# Patient Record
Sex: MALE | Employment: OTHER | URBAN - METROPOLITAN AREA
[De-identification: names, ages, dates, MRNs, and addresses within clinical notes are randomized per-mention and may not be internally consistent; named-entity substitution may affect disease eponyms.]

---

## 2018-11-14 ENCOUNTER — OFFICE VISIT (OUTPATIENT)
Dept: CARDIOLOGY CLINIC | Facility: CLINIC | Age: 83
End: 2018-11-14
Payer: MEDICARE

## 2018-11-14 VITALS
BODY MASS INDEX: 28.04 KG/M2 | OXYGEN SATURATION: 98 % | SYSTOLIC BLOOD PRESSURE: 140 MMHG | HEIGHT: 71 IN | DIASTOLIC BLOOD PRESSURE: 70 MMHG | HEART RATE: 64 BPM | WEIGHT: 200.3 LBS

## 2018-11-14 DIAGNOSIS — R07.9 CHEST PAIN IN ADULT: ICD-10-CM

## 2018-11-14 DIAGNOSIS — R06.00 EXERTIONAL DYSPNEA: Primary | ICD-10-CM

## 2018-11-14 DIAGNOSIS — I48.0 PAROXYSMAL A-FIB (HCC): ICD-10-CM

## 2018-11-14 DIAGNOSIS — R94.31 ABNORMAL EKG: ICD-10-CM

## 2018-11-14 PROCEDURE — 93000 ELECTROCARDIOGRAM COMPLETE: CPT | Performed by: INTERNAL MEDICINE

## 2018-11-14 PROCEDURE — 99204 OFFICE O/P NEW MOD 45 MIN: CPT | Performed by: INTERNAL MEDICINE

## 2018-11-14 RX ORDER — UBIDECARENONE 100 MG
100 CAPSULE ORAL DAILY
COMMUNITY

## 2018-11-14 RX ORDER — SIMVASTATIN 20 MG
20 TABLET ORAL DAILY
COMMUNITY
Start: 2018-11-03 | End: 2018-12-28 | Stop reason: SDUPTHER

## 2018-11-14 RX ORDER — AMLODIPINE AND VALSARTAN 5; 160 MG/1; MG/1
1 TABLET ORAL DAILY
COMMUNITY
Start: 2018-10-06 | End: 2018-12-28 | Stop reason: SDUPTHER

## 2018-11-14 RX ORDER — LUTEIN 6 MG
TABLET ORAL
COMMUNITY

## 2018-11-14 RX ORDER — OMEGA-3-ACID ETHYL ESTERS 1 G/1
2 CAPSULE, LIQUID FILLED ORAL DAILY
COMMUNITY
End: 2020-01-08

## 2018-11-14 RX ORDER — IBUPROFEN 800 MG
TABLET ORAL DAILY
COMMUNITY

## 2018-11-14 RX ORDER — DIPHENOXYLATE HYDROCHLORIDE AND ATROPINE SULFATE 2.5; .025 MG/1; MG/1
1 TABLET ORAL DAILY
COMMUNITY

## 2018-11-14 RX ORDER — METOPROLOL SUCCINATE 25 MG/1
25 TABLET, EXTENDED RELEASE ORAL DAILY
COMMUNITY
Start: 2018-09-27 | End: 2018-12-28 | Stop reason: SDUPTHER

## 2018-11-14 NOTE — PROGRESS NOTES
Cardiology Outpatient Consultation                                                           Osbaldo Larsen  [id number]  1934      Consult for:Dyspnea  Appreciate consult by: Smooth Naik DO/ Dr Karlene Fatima    1  Exertional dyspnea     2  Chest pain in adult         Discussion/Summary:  Exertional Dyspnea/abnormal ekg- lexiscan stress test to rule out ischemia  Bifascicular heart block likely degenerative  Murmur/Dyspnea- echo 2d to evaluate diastolic function    Paroxysmal afib- continue aspirin 81mg every other day with food + simvastatin 20mg    GERD- pending GI scope    Carotid Artery U/S- aspirin     Unstable gait/shuffling- balance center assessment/physical activity? Follow-up with primary pending    HPI:  80-year-old gentleman with history of bifascicular heart block, paroxysmal atrial fibrillation previously on anticoagulation currently on aspirin, mild carotid artery disease presents for initial visitation  Reports having some exertional shortness of breath  No prior history of myocardial infarction or stent placement  States having some mild lower extremity edema  Reports a sedentary lifestyle after a fall  He reports his last fall was mechanical   Reports having unstable gait               PMH  Paroxysmal atrial fibrillation  Sick Sinus Syndrome  Mixed Hld  Htn  Carotid Stenosis- aspirin      Social Hx  Wife  Son [place]   Retired  Former smoker quit 1965      History reviewed  No pertinent past medical history  Social History     Social History    Marital status: /Civil Union     Spouse name: N/A    Number of children: N/A    Years of education: N/A     Occupational History    Not on file       Social History Main Topics    Smoking status: Former Smoker     Quit date: 1950    Smokeless tobacco: Never Used    Alcohol use No    Drug use: Unknown    Sexual activity: Not on file     Other Topics Concern    Not on file     Social History Narrative    No narrative on file      Family History   Problem Relation Age of Onset    No Known Problems Mother     No Known Problems Father      History reviewed  No pertinent surgical history  Current Outpatient Prescriptions:     amLODIPine-valsartan (EXFORGE) 5-160 MG per tablet, , Disp: , Rfl:     Bioflavonoid Products (KJ C PO), Take by mouth, Disp: , Rfl:     Calcium Carb-Cholecalciferol 600-1000 MG-UNIT CAPS, Take by mouth, Disp: , Rfl:     cholecalciferol (VITAMIN D3) 1,000 units tablet, Take 1,000 Units by mouth daily, Disp: , Rfl:     co-enzyme Q-10 30 MG capsule, Take 30 mg by mouth daily, Disp: , Rfl:     DEXILANT 60 MG capsule, Take 60 mg by mouth daily, Disp: , Rfl:     Lutein 10 MG TABS, Take by mouth, Disp: , Rfl:     metoprolol succinate (TOPROL-XL) 25 mg 24 hr tablet, Take 25 mg by mouth daily, Disp: , Rfl:     multivitamin (THERAGRAN) TABS, Take 1 tablet by mouth daily, Disp: , Rfl:     omega-3-acid ethyl esters (LOVAZA) 1 g capsule, Take 2 g by mouth daily, Disp: , Rfl:     Probiotic Product (PROBIOTIC-10 PO), Take by mouth, Disp: , Rfl:     simvastatin (ZOCOR) 20 mg tablet, Take 20 mg by mouth daily, Disp: , Rfl:     SUPER B COMPLEX/C PO, Take by mouth, Disp: , Rfl:   No Known Allergies  Vitals:    11/14/18 1511   BP: 140/70   BP Location: Right arm   Patient Position: Sitting   Cuff Size: Large   Pulse: 64   SpO2: 98%   Weight: 90 9 kg (200 lb 4 8 oz)   Height: 5' 11 25" (1 81 m)       Review of Systems:  Review of Systems   Constitutional: Negative  HENT: Negative  Eyes: Negative  Respiratory: Positive for shortness of breath  Cardiovascular: Positive for chest pain  Gastrointestinal: Negative  Endocrine: Negative  Genitourinary: Negative  Musculoskeletal: Negative  Skin: Negative  Allergic/Immunologic: Negative  Neurological: Negative  Hematological: Negative  Psychiatric/Behavioral: Negative          Vitals:    11/14/18 1511   BP: 140/70   BP Location: Right arm   Patient Position: Sitting   Cuff Size: Large   Pulse: 64   SpO2: 98%   Weight: 90 9 kg (200 lb 4 8 oz)   Height: 5' 11 25" (1 81 m)     Physical Exam:  Physical Exam   Constitutional: He is oriented to person, place, and time  He appears well-developed and well-nourished  No distress  HENT:   Head: Normocephalic and atraumatic  Right Ear: External ear normal    Left Ear: External ear normal    Eyes: Pupils are equal, round, and reactive to light  Conjunctivae are normal  Right eye exhibits no discharge  Left eye exhibits no discharge  No scleral icterus  Neck: Normal range of motion  Neck supple  No JVD present  No tracheal deviation present  No thyromegaly present  Cardiovascular: Normal rate and regular rhythm  Exam reveals gallop  Exam reveals no friction rub  Murmur heard  Pulmonary/Chest: Effort normal and breath sounds normal  No stridor  No respiratory distress  He has no wheezes  He has no rales  He exhibits no tenderness  Abdominal: Soft  Bowel sounds are normal  He exhibits no distension and no mass  There is no tenderness  There is no rebound and no guarding  Musculoskeletal: Normal range of motion  He exhibits no edema, tenderness or deformity  Neurological: He is alert and oriented to person, place, and time  He has normal reflexes  No cranial nerve deficit  He exhibits normal muscle tone  Coordination normal    Skin: Skin is warm and dry  No rash noted  He is not diaphoretic  No erythema  No pallor  Psychiatric: He has a normal mood and affect  His behavior is normal  Judgment and thought content normal    Nursing note and vitals reviewed  Labs:   Pending    Imaging & Testing   I have personally reviewed pertinent reports  EKG: Personally reviewed      Normal sinus rhythm  Right bundle branch left anterior fascicular block  Cannot rule out septal infract    Cardiac testing:   No results found for this or any previous visit  Kirsten Yip MD [facility] [phone]  Please call with any questions or suggestions    Counseling :  A description of the counseling:   Goals and Barriers:  Patient's ability to self care:  Medication side effect reviewed with patient in detail and all their questions answered  "This note has been constructed using a voice recognition system  Therefore there may be syntax, spelling, and/or grammatical errors   Please call if you have any questions  "

## 2018-11-25 PROBLEM — G89.29 CHRONIC PAIN OF LEFT KNEE: Status: ACTIVE | Noted: 2017-06-20

## 2018-11-25 PROBLEM — I49.5 SICK SINUS SYNDROME (HCC): Status: ACTIVE | Noted: 2018-11-25

## 2018-11-25 PROBLEM — N18.6 END STAGE RENAL DISEASE (HCC): Status: ACTIVE | Noted: 2018-02-27

## 2018-11-25 PROBLEM — M54.17 LUMBOSACRAL RADICULOPATHY: Status: ACTIVE | Noted: 2018-04-09

## 2018-11-25 PROBLEM — M62.81 MUSCLE WEAKNESS: Status: ACTIVE | Noted: 2018-02-27

## 2018-11-25 PROBLEM — M25.562 CHRONIC PAIN OF LEFT KNEE: Status: ACTIVE | Noted: 2017-06-20

## 2018-11-25 PROBLEM — R42 VERTIGO: Status: ACTIVE | Noted: 2018-03-11

## 2018-11-25 PROBLEM — I25.10 CAD (CORONARY ARTERY DISEASE): Status: ACTIVE | Noted: 2018-11-25

## 2018-11-25 PROBLEM — F03.90 UNCOMPLICATED SENILE DEMENTIA (HCC): Status: ACTIVE | Noted: 2017-10-12

## 2018-11-25 PROBLEM — C61 PROSTATE CANCER (HCC): Status: ACTIVE | Noted: 2017-10-12

## 2018-11-25 PROBLEM — I10 HYPERTENSIVE DISORDER: Status: ACTIVE | Noted: 2018-04-09

## 2018-11-25 PROBLEM — R53.83 FATIGUE: Status: ACTIVE | Noted: 2018-11-25

## 2018-11-25 PROBLEM — I48.0 PAROXYSMAL ATRIAL FIBRILLATION (HCC): Status: ACTIVE | Noted: 2018-11-25

## 2018-11-25 PROBLEM — S06.360A TRAUMATIC HEMORRHAGE OF CEREBRUM WITHOUT LOSS OF CONSCIOUSNESS (HCC): Status: ACTIVE | Noted: 2018-03-11

## 2018-11-26 ENCOUNTER — TRANSCRIBE ORDERS (OUTPATIENT)
Dept: ADMINISTRATIVE | Facility: HOSPITAL | Age: 83
End: 2018-11-26

## 2018-11-26 ENCOUNTER — HOSPITAL ENCOUNTER (OUTPATIENT)
Dept: RADIOLOGY | Facility: HOSPITAL | Age: 83
Discharge: HOME | End: 2018-11-26
Payer: MEDICARE

## 2018-11-26 ENCOUNTER — OFFICE VISIT (OUTPATIENT)
Dept: FAMILY MEDICINE CLINIC | Facility: CLINIC | Age: 83
End: 2018-11-26
Payer: MEDICARE

## 2018-11-26 VITALS
DIASTOLIC BLOOD PRESSURE: 76 MMHG | HEART RATE: 76 BPM | BODY MASS INDEX: 28.14 KG/M2 | WEIGHT: 201 LBS | SYSTOLIC BLOOD PRESSURE: 134 MMHG | TEMPERATURE: 97.1 F | RESPIRATION RATE: 18 BRPM | HEIGHT: 71 IN

## 2018-11-26 DIAGNOSIS — C44.41 BASAL CELL CARCINOMA OF SKIN OF SCALP AND NECK: ICD-10-CM

## 2018-11-26 DIAGNOSIS — R06.00 EXERTIONAL DYSPNEA: ICD-10-CM

## 2018-11-26 DIAGNOSIS — I10 BENIGN ESSENTIAL HYPERTENSION: ICD-10-CM

## 2018-11-26 DIAGNOSIS — I48.0 PAROXYSMAL ATRIAL FIBRILLATION (HCC): ICD-10-CM

## 2018-11-26 DIAGNOSIS — R05.3 PERSISTENT COUGH: Primary | ICD-10-CM

## 2018-11-26 DIAGNOSIS — R05.3 PERSISTENT COUGH: ICD-10-CM

## 2018-11-26 DIAGNOSIS — K21.9 GASTROESOPHAGEAL REFLUX DISEASE, ESOPHAGITIS PRESENCE NOT SPECIFIED: ICD-10-CM

## 2018-11-26 DIAGNOSIS — E78.5 HYPERLIPIDEMIA, UNSPECIFIED HYPERLIPIDEMIA TYPE: ICD-10-CM

## 2018-11-26 PROCEDURE — 99204 OFFICE O/P NEW MOD 45 MIN: CPT | Performed by: NURSE PRACTITIONER

## 2018-11-26 PROCEDURE — 71046 X-RAY EXAM CHEST 2 VIEWS: CPT

## 2018-11-26 NOTE — PROGRESS NOTES
Subjective:      Patient ID: Jed Mota is a 80 y o  male  Chief Complaint   Patient presents with    Cough     patient states started yesterday 11/25/18  af/rma        HPI  New to practice  Personal and family medical history reviewed  Seen cardiologist recently for exertional dyspnea and scheduled for stress test and ECHO  All medications reviewed with the patient  Has appt with Dr Joseph Vargas on 11/28 to establish care with him as PCP  Complaint with his medications  Has h/o skin cancer and under care of dermatologist and recently had biopsy done at nose area and waiting for results  Stated that having mild cough on and off from days and got worse last night and was very productive and this morning changed to dry cough  Denies chest pain, fever and chills  Stated that have exterional dyspnea and seeing cardiologist and cough did not change the severity of dyspnea  Patient has h/o Afib and was on anticoagulation and stopped when had the traumatic hemorrhage of cerebrum secondary to fall and on aspirin only  The following portions of the patient's history were reviewed and updated as appropriate: allergies, current medications, past family history, past medical history, past social history, past surgical history and problem list       Review of Systems   Constitutional: Negative for chills, fatigue and fever  HENT: Negative for congestion, ear discharge, ear pain, facial swelling, hearing loss, mouth sores, nosebleeds, postnasal drip, rhinorrhea, sinus pain, sinus pressure, sneezing, sore throat, trouble swallowing and voice change  Respiratory: Positive for cough and shortness of breath (as noted in HPI)  Negative for chest tightness and wheezing  Cardiovascular: Negative  Gastrointestinal: Negative for abdominal pain, constipation, diarrhea and nausea  Genitourinary: Negative  Musculoskeletal: Negative      Skin:        As noted in HPI     Neurological: Negative for dizziness, weakness, light-headedness and headaches  Psychiatric/Behavioral: Negative  Objective:    History   Smoking Status    Former Smoker    Quit date: 1950   Smokeless Tobacco    Never Used       Allergies: No Known Allergies    Vitals:  /76   Pulse 76   Temp (!) 97 1 °F (36 2 °C)   Resp 18   Ht 5' 11 25" (1 81 m)   Wt 91 2 kg (201 lb)   BMI 27 84 kg/m²     Current Outpatient Prescriptions   Medication Sig Dispense Refill    amLODIPine-valsartan (EXFORGE) 5-160 MG per tablet Take 1 tablet by mouth daily        Bioflavonoid Products (KJ C PO) Take by mouth      Calcium Carb-Cholecalciferol 600-1000 MG-UNIT CAPS Take by mouth      Cholecalciferol (VITAMIN D3) 400 units CAPS Take by mouth daily        co-enzyme Q-10 100 mg capsule Take 100 mg by mouth daily        DEXILANT 60 MG capsule Take 60 mg by mouth daily      Lutein 20 MG TABS Take by mouth      metoprolol succinate (TOPROL-XL) 25 mg 24 hr tablet Take 25 mg by mouth daily      multivitamin (THERAGRAN) TABS Take 1 tablet by mouth daily      omega-3-acid ethyl esters (LOVAZA) 1 g capsule Take 2 g by mouth daily      Probiotic Product (PROBIOTIC-10 PO) Take by mouth      simvastatin (ZOCOR) 20 mg tablet Take 20 mg by mouth daily      SUPER B COMPLEX/C PO Take by mouth       No current facility-administered medications for this visit  Physical Exam   Constitutional: He is oriented to person, place, and time  He appears well-developed and well-nourished  HENT:   Head: Normocephalic  Right Ear: Tympanic membrane, external ear and ear canal normal    Left Ear: Tympanic membrane, external ear and ear canal normal    Nose: Nose normal  Right sinus exhibits no maxillary sinus tenderness and no frontal sinus tenderness  Left sinus exhibits no maxillary sinus tenderness and no frontal sinus tenderness  Mouth/Throat: Oropharynx is clear and moist and mucous membranes are normal    Neck: Neck supple  Cardiovascular: Normal rate and regular rhythm  Murmur heard  Pulmonary/Chest: Effort normal  He has decreased breath sounds in the left upper field  Abdominal: Normal appearance and bowel sounds are normal  There is no hepatosplenomegaly  There is no tenderness  There is no rebound  Musculoskeletal: Normal range of motion  Lymphadenopathy:        Right cervical: No superficial cervical and no posterior cervical adenopathy present  Left cervical: No superficial cervical and no posterior cervical adenopathy present  Right: No supraclavicular adenopathy present  Left: No supraclavicular adenopathy present  Neurological: He is alert and oriented to person, place, and time  Skin: Skin is warm and dry  Psychiatric: He has a normal mood and affect  His behavior is normal  Judgment and thought content normal    Vitals reviewed  Assessment/Plan:         Diagnoses and all orders for this visit:    Persistent cough  Comments:  RUL is diminised on auscultation and xray ordered to r/o pneumonia  Orders:  -     XR chest pa & lateral; Future    Basal cell carcinoma of skin of scalp and neck  Comments:  Under care of dermatologist    Benign essential hypertension  Comments:  Under care of cardiologist and BP stable with current regimen    Paroxysmal atrial fibrillation ([address] )  Comments:  Last EKG done on 11/14 and showed SR and on aspirin  Hyperlipidemia, unspecified hyperlipidemia type  Comments:  on statin and tolearting it well    Exertional dyspnea  Comments:  managed by cardiologist and ECHO and stress test ordered  Gastroesophageal reflux disease, esophagitis presence not specified  Comments:  Stable with dexilant  BMI 27 0-27 9,adult            Patient Instructions:  Increase fluid intake, saline nasal rinses, and hot tea with honey and lemon  Cool air humidification can be helpful as well  M ay take Ibuprofen or Tylenol as needed for pain or fevers  Mucinex D for sinus congestion or Coricidin HBP if you have high blood pressure or a heart condition  Mucinex or Robitussin DM are effective for cough and chest congestion  Supportive care discussed and advised  Follow up for no improvement and worsening of conditions  Patient advised and educated when to see immediate medical care  Return if symptoms worsen or fail to improve        JOLANTA Appiah

## 2018-11-26 NOTE — PATIENT INSTRUCTIONS
Increase fluid intake, saline nasal rinses, and hot tea with honey and lemon  Cool air humidification can be helpful as well  M ay take Ibuprofen or Tylenol as needed for pain or fevers  Mucinex D for sinus congestion or Coricidin HBP if you have high blood pressure or a heart condition  Mucinex or Robitussin DM are effective for cough and chest congestion  Supportive care discussed and advised  Follow up for no improvement and worsening of conditions  Patient advised and educated when to see immediate medical care

## 2018-11-28 ENCOUNTER — OFFICE VISIT (OUTPATIENT)
Dept: FAMILY MEDICINE CLINIC | Facility: CLINIC | Age: 83
End: 2018-11-28
Payer: MEDICARE

## 2018-11-28 VITALS
WEIGHT: 198 LBS | RESPIRATION RATE: 16 BRPM | HEIGHT: 71 IN | DIASTOLIC BLOOD PRESSURE: 88 MMHG | TEMPERATURE: 96.6 F | HEART RATE: 64 BPM | BODY MASS INDEX: 27.72 KG/M2 | SYSTOLIC BLOOD PRESSURE: 148 MMHG

## 2018-11-28 DIAGNOSIS — I48.0 PAROXYSMAL ATRIAL FIBRILLATION (HCC): ICD-10-CM

## 2018-11-28 DIAGNOSIS — I10 BENIGN ESSENTIAL HYPERTENSION: ICD-10-CM

## 2018-11-28 DIAGNOSIS — E78.5 HYPERLIPIDEMIA, UNSPECIFIED HYPERLIPIDEMIA TYPE: ICD-10-CM

## 2018-11-28 DIAGNOSIS — C61 PROSTATE CANCER (HCC): ICD-10-CM

## 2018-11-28 DIAGNOSIS — C44.311 BASAL CELL CARCINOMA (BCC) OF SKIN OF NOSE: ICD-10-CM

## 2018-11-28 DIAGNOSIS — D51.0 PERNICIOUS ANEMIA: ICD-10-CM

## 2018-11-28 DIAGNOSIS — Z00.00 MEDICARE ANNUAL WELLNESS VISIT, INITIAL: Primary | ICD-10-CM

## 2018-11-28 PROBLEM — F03.90 UNCOMPLICATED SENILE DEMENTIA (HCC): Status: RESOLVED | Noted: 2017-10-12 | Resolved: 2018-11-28

## 2018-11-28 PROBLEM — N18.6 END STAGE RENAL DISEASE (HCC): Status: RESOLVED | Noted: 2018-02-27 | Resolved: 2018-11-28

## 2018-11-28 PROBLEM — R42 VERTIGO: Status: RESOLVED | Noted: 2018-03-11 | Resolved: 2018-11-28

## 2018-11-28 PROBLEM — K21.9 GERD (GASTROESOPHAGEAL REFLUX DISEASE): Status: ACTIVE | Noted: 2018-11-28

## 2018-11-28 PROCEDURE — G0438 PPPS, INITIAL VISIT: HCPCS | Performed by: FAMILY MEDICINE

## 2018-11-28 PROCEDURE — 99214 OFFICE O/P EST MOD 30 MIN: CPT | Performed by: FAMILY MEDICINE

## 2018-11-28 RX ORDER — ASPIRIN 81 MG/1
81 TABLET ORAL DAILY
COMMUNITY

## 2018-11-28 NOTE — PROGRESS NOTES
Assessment/Plan:    No problem-specific Assessment & Plan notes found for this encounter  I will refill all bp and chol meds if no major cardiac issues on upcoming stress test and echocardiogram    Yearly psa for prostate CA hx  Anemia? Per records, check labs to clarify  CAD? Await cardiac studies, cont statin  q6m labs and f/u advised     Diagnoses and all orders for this visit:    Medicare annual wellness visit, initial    [facility] ([facility] )  -     Cancel: TSH, 3rd generation; Future  -     TSH, 3rd generation; Future    Hyperlipidemia, unspecified hyperlipidemia type    Benign essential hypertension    Pernicious anemia  -     Cancel: CBC and differential; Future  -     Cancel: Iron Saturation %; Future  -     Cancel: Vitamin B12; Future  -     CBC and differential; Future  -     Iron Saturation %; Future  -     Vitamin B12; Future    Prostate cancer ([facility] )  -     Cancel: PSA, Total Screen; Future  -     PSA, Total Screen; Future    Basal cell carcinoma (BCC) of skin of nose    Other orders  -     aspirin (ECOTRIN LOW STRENGTH) 81 mg EC tablet; Take 81 mg by mouth daily              Return in about 1 month (around 12/28/2018) for Recheck  Subjective:      Patient ID: Fauzia Gomes is a 80 y o  male  Chief Complaint   Patient presents with   [address]       HPI   New pt  Hx of AF, now PAF  Hx of eliquis  Now just patricio Schmid on ice Feb 2018  Been to PT, inpt and outpt  Feels ok now  Declines more pt    htn meds from prior doctor  Seen by cardiologist  Advised might ask cardiologist to manage meds related to chf/paf in future    dexilant from Dr Rowdy Sanchez suggesting increased risk of ischemic CVA and chronic kidney damage with PPI use was advised  No CAD per pt    Last labs was Feb 2017    ESRD? Pt denies  Anemia?  Pt denies  Had been on iron in past    Eats/drink ok  Wife thinks he should drink more  Gets dizzy at times with position changes    Does not recall any AWV screening in the past by prior pcp    The following portions of the patient's history were reviewed and updated as appropriate: allergies, current medications, past family history, past medical history, past social history, past surgical history and problem list     Review of Systems   Constitutional: Positive for fatigue  Negative for appetite change, chills and fever  HENT: Negative for nosebleeds and trouble swallowing  Eyes: Negative for pain and redness  Respiratory: Positive for shortness of breath  Negative for cough  Cardiovascular: Positive for leg swelling  Negative for chest pain and palpitations  Gastrointestinal: Negative for blood in stool, bowel incontinence and nausea  Genitourinary: Negative for difficulty urinating and dysuria  Musculoskeletal: Negative for gait problem and myalgias  Skin: Negative for pallor and rash  Neurological: Negative for seizures and syncope  Psychiatric/Behavioral: Negative for agitation and suicidal ideas  The patient is not nervous/anxious            Current Outpatient Prescriptions   Medication Sig Dispense Refill    amLODIPine-valsartan (EXFORGE) 5-160 MG per tablet Take 1 tablet by mouth daily        aspirin (ECOTRIN LOW STRENGTH) 81 mg EC tablet Take 81 mg by mouth daily      Bioflavonoid Products (KJ C PO) Take by mouth      Calcium Carb-Cholecalciferol 600-1000 MG-UNIT CAPS Take by mouth      Cholecalciferol (VITAMIN D3) 400 units CAPS Take by mouth daily        co-enzyme Q-10 100 mg capsule Take 100 mg by mouth daily        DEXILANT 60 MG capsule Take 60 mg by mouth daily      Lutein 20 MG TABS Take by mouth      metoprolol succinate (TOPROL-XL) 25 mg 24 hr tablet Take 25 mg by mouth daily      multivitamin (THERAGRAN) TABS Take 1 tablet by mouth daily      omega-3-acid ethyl esters (LOVAZA) 1 g capsule Take 2 g by mouth daily      Probiotic Product (PROBIOTIC-10 PO) Take by mouth      simvastatin (ZOCOR) 20 mg tablet Take 20 mg by mouth daily      SUPER B COMPLEX/C PO Take by mouth       No current facility-administered medications for this visit  Objective:    /88   Pulse 64   Temp (!) 96 6 °F (35 9 °C)   Resp 16   Ht 5' 11 25" (1 81 m)   Wt 89 8 kg (198 lb)   BMI 27 42 kg/m²        Physical Exam   Constitutional: He appears well-developed  No distress  HENT:   Head: Normocephalic  Eyes: Conjunctivae are normal  Right eye exhibits no discharge  Left eye exhibits no discharge  No scleral icterus  Neck: Neck supple  Carotid bruit is not present  No thyromegaly present  Cardiovascular: Normal rate and intact distal pulses  No murmur heard  Pulmonary/Chest: Effort normal  No respiratory distress  He has no wheezes  He has no rales  Abdominal: Soft  There is no tenderness  Musculoskeletal: He exhibits no edema or deformity  Lymphadenopathy:     He has no cervical adenopathy  Neurological: He is alert  He exhibits normal muscle tone  Skin: Skin is warm and dry  No rash noted  No pallor  Psychiatric: His behavior is normal  Thought content normal    Nursing note and vitals reviewed  Shanon Akil, DO  Assessment and Plan:    Problem List Items Addressed This Visit     Basal cell carcinoma (BCC) of skin of nose    Benign essential hypertension    Hyperlipidemia    Medicare annual wellness visit, initial - Primary    Paroxysmal atrial fibrillation (HCC)    Relevant Orders    TSH, 3rd generation    Pernicious anemia    Relevant Orders    CBC and differential    Iron Saturation %    Vitamin B12    Pro[facility] cancer ([facility] )    Relevant Orders    PSA, Total Screen        Health Maintenance Due   Topic Date Due    Depression Screening PHQ  1934    INFLUENZA VACCINE  07/01/2018         HPI:  Yazmin Loja is a 80 y o  male here for his Initial Wellness Visit      Patient Active Problem List   Diagnosis    Anemia in other chronic diseases classified elsewhere    Basal cell carcinoma of skin of scalp and neck    CAD (coronary artery disease)    Carotid stenosis, bilateral    Chronic fatigue syndrome    Chronic pain of left knee    Edema    Benign essential hypertension    Fatigue    Heart murmur    Hyperlipidemia    Hypertensive disorder    Inguinal hernia    Lumbosacral radiculopathy    Muscle weakness    Paroxysmal atrial fibrillation (HCC)    Pernicious anemia    Prostate cancer (HCC)    Short-term memory loss    Sick sinus syndrome (HCC)    Traumatic hemorrhage of cerebrum without loss of consciousness (HCC)    Exertional dyspnea    GERD (gastroesophageal reflux disease)    Basal cell carcinoma (BCC) of skin of nose    Medicare annual wellness visit, initial     No past medical history on file  No past surgical history on file    Family History   Problem Relation Age of Onset    No Known Problems Mother     No Known Problems Father      History   Smoking Status    Former Smoker    Quit date: 1950   Smokeless Tobacco    Never Used     History   Alcohol Use No      History   Drug use: Unknown       Current Outpatient Prescriptions   Medication Sig Dispense Refill    amLODIPine-valsartan (Margaretta Lair) 5-160 MG per tablet Take 1 tablet by mouth daily        aspirin (ECOTRIN LOW STRENGTH) 81 mg EC tablet Take 81 mg by mouth daily      Bioflavonoid Products (KJ C PO) Take by mouth      Calcium Carb-Cholecalciferol 600-1000 MG-UNIT CAPS Take by mouth      Cholecalciferol (VITAMIN D3) 400 units CAPS Take by mouth daily        co-enzyme Q-10 100 mg capsule Take 100 mg by mouth daily        DEXILANT 60 MG capsule Take 60 mg by mouth daily      Lutein 20 MG TABS Take by mouth      metoprolol succinate (TOPROL-XL) 25 mg 24 hr tablet Take 25 mg by mouth daily      multivitamin (THERAGRAN) TABS Take 1 tablet by mouth daily      omega-3-acid ethyl esters (LOVAZA) 1 g capsule Take 2 g by mouth daily      Probiotic Product (PROBIOTIC-10 PO) Take by mouth      simvastatin (ZOCOR) 20 mg tablet Take 20 mg by mouth daily      SUPER B COMPLEX/C PO Take by mouth       No current facility-administered medications for this visit  No Known Allergies  Immunization History   Administered Date(s) Administered    DTaP 03/05/2010    Influenza 10/16/2006, 09/08/2010, 09/19/2011, 10/03/2012, 09/15/2013, 09/01/2017    Influenza Split High Dose Preservative Free IM 10/15/2014, 10/26/2015, 10/21/2016    Pneumococcal Conjugate 13-Valent 10/26/2015    Pneumococcal Polysaccharide PPV23 03/07/2011    Td (adult), adsorbed 03/05/2010    Zoster 11/14/2011       Patient Care Team:  Flynn Wolfe DO as PCP - General (Family Medicine)    Medicare Screening Tests and Risk Assessments: Francois Gutierrez is here for his Initial Wellness visit  Health Risk Assessment:  Patient rates overall health as very good  Patient feels that their physical health rating is Slightly worse  Eyesight was rated as Same  Hearing was rated as Same  Patient feels that their emotional and mental health rating is Same  Pain experienced by patient in the last 7 days has been None  Emotional/Mental Health:  Patient has been feeling nervous/anxious  PHQ-9 Depression Screening:    Frequency of the following problems over the past two weeks:      1  Little interest or pleasure in doing things: 0 - not at all      2  Feeling down, depressed, or hopeless: 0 - not at all  PHQ-2 Score: 0          Broken Bones/Falls: Fall Risk Assessment:    In the past year, patient has experienced: History of falling in past year     Number of falls: 1  Patient feels unsteady standing  Patient is not taking medication that can cause feelings of lightheadedness or tiredness  Chronic conditions that may contribute to falls neurologic disease  Bladder/Bowel:  Patient has not leaked urine accidently in the last six months  Patient reports no loss of bowel control      Immunizations:  Patient has had a flu vaccination within the last year  Patient has received a pneumonia shot  Patient has received a shingles shot  Patient has received tetanus/diphtheria shot  Home Safety:  Patient has trouble with stairs inside or outside of their home  Patient currently reports that there are no safety hazards present in home, working smoke alarms, working carbon monoxide detectors  Preventative Screenings:   prostate cancer screen performed, colon cancer screen completed, cholesterol screen completed, glaucoma eye exam completed,     Nutrition:  Current diet: Regular and Limited junk food with servings of the following:    Medications:  Patient is not currently taking any over-the-counter supplements  Patient is able to manage medications  Lifestyle Choices:  Patient reports no tobacco use  Patient has smoked or used tobacco in the past   Patient has stopped his tobacco use  Tobacco use quit date: 1995  Patient reports no alcohol use  Patient does not drive a vehicle  Patient wears seat belt  Current level of exercise of physical activity described by patient as: none  Activities of Daily Living:  Can get out of bed by his or her self, able to dress self, unable to make own meals, unable to do own shopping, able to bathe self, can do own laundry/housekeeping, unable to manage own money and other related tasks    Previous Hospitalizations:  Hospitalization or ED visit in past 12 months  Additional Comments: 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    Advanced Directives:  Patient has decided on a power of   Patient has spoken to designated power of   Patient has completed advanced directive          Preventative Screening/Counseling:      Cardiovascular:      General: Risks and Benefits Discussed and Screening Current          Diabetes:      General: Risks and Benefits Discussed          Colorectal Cancer:      General: Risks and Benefits Discussed and Screening Not Indicated          Prostate Cancer: General: Risks and Benefits Discussed          Osteoporosis:      General: Screening Not Indicated          AAA:      General: Screening Not Indicated          Glaucoma:      General: Risks and Benefits Discussed          HIV:      General: Screening Not Indicated          Hepatitis C:      General: Screening Not Indicated        Advanced Directives:   Patient has living will for healthcare, No end of life assessment reviewed with patient

## 2018-12-05 LAB
ALBUMIN SERPL-MCNC: 4.4 G/DL (ref 3.5–4.7)
ALBUMIN/GLOB SERPL: 1.8 {RATIO} (ref 1.2–2.2)
ALP SERPL-CCNC: 69 IU/L (ref 39–117)
ALT SERPL-CCNC: 17 IU/L (ref 0–44)
AST SERPL-CCNC: 26 IU/L (ref 0–40)
BASOPHILS # BLD AUTO: 0 X10E3/UL (ref 0–0.2)
BASOPHILS NFR BLD AUTO: 0 %
BILIRUB SERPL-MCNC: <0.2 MG/DL (ref 0–1.2)
BUN SERPL-MCNC: 11 MG/DL (ref 8–27)
BUN/CREAT SERPL: 11 (ref 10–24)
CALCIUM SERPL-MCNC: 8.5 MG/DL (ref 8.6–10.2)
CHLORIDE SERPL-SCNC: 100 MMOL/L (ref 96–106)
CO2 SERPL-SCNC: 23 MMOL/L (ref 20–29)
CREAT SERPL-MCNC: 0.99 MG/DL (ref 0.76–1.27)
EOSINOPHIL # BLD AUTO: 0.1 X10E3/UL (ref 0–0.4)
EOSINOPHIL NFR BLD AUTO: 2 %
ERYTHROCYTE [DISTWIDTH] IN BLOOD BY AUTOMATED COUNT: 13.8 % (ref 12.3–15.4)
GLOBULIN SER-MCNC: 2.4 G/DL (ref 1.5–4.5)
GLUCOSE SERPL-MCNC: 108 MG/DL (ref 65–99)
HCT VFR BLD AUTO: 40.7 % (ref 37.5–51)
HGB BLD-MCNC: 14.1 G/DL (ref 13–17.7)
IMM GRANULOCYTES # BLD: 0 X10E3/UL (ref 0–0.1)
IMM GRANULOCYTES NFR BLD: 0 %
IRON SATN MFR SERPL: 16 % (ref 15–55)
IRON SERPL-MCNC: 48 UG/DL (ref 38–169)
LABCORP COMMENT: NORMAL
LYMPHOCYTES # BLD AUTO: 2.4 X10E3/UL (ref 0.7–3.1)
LYMPHOCYTES NFR BLD AUTO: 34 %
MCH RBC QN AUTO: 30.9 PG (ref 26.6–33)
MCHC RBC AUTO-ENTMCNC: 34.6 G/DL (ref 31.5–35.7)
MCV RBC AUTO: 89 FL (ref 79–97)
MONOCYTES # BLD AUTO: 0.6 X10E3/UL (ref 0.1–0.9)
MONOCYTES NFR BLD AUTO: 8 %
NEUTROPHILS # BLD AUTO: 3.9 X10E3/UL (ref 1.4–7)
NEUTROPHILS NFR BLD AUTO: 56 %
PLATELET # BLD AUTO: 249 X10E3/UL (ref 150–379)
POTASSIUM SERPL-SCNC: 3.9 MMOL/L (ref 3.5–5.2)
PROT SERPL-MCNC: 6.8 G/DL (ref 6–8.5)
PSA SERPL-MCNC: <0.1 NG/ML (ref 0–4)
RBC # BLD AUTO: 4.56 X10E6/UL (ref 4.14–5.8)
SL AMB EGFR AFRICAN AMERICAN: 81 ML/MIN/1.73
SL AMB EGFR NON AFRICAN AMERICAN: 70 ML/MIN/1.73
SODIUM SERPL-SCNC: 136 MMOL/L (ref 134–144)
TIBC SERPL-MCNC: 306 UG/DL (ref 250–450)
TSH SERPL DL<=0.005 MIU/L-ACNC: 3.18 UIU/ML (ref 0.45–4.5)
UIBC SERPL-MCNC: 258 UG/DL (ref 111–343)
VIT B12 SERPL-MCNC: 1216 PG/ML (ref 232–1245)
WBC # BLD AUTO: 7 X10E3/UL (ref 3.4–10.8)

## 2018-12-13 ENCOUNTER — HOSPITAL ENCOUNTER (OUTPATIENT)
Dept: NON INVASIVE DIAGNOSTICS | Facility: HOSPITAL | Age: 83
Discharge: HOME | End: 2018-12-13
Attending: INTERNAL MEDICINE
Payer: MEDICARE

## 2018-12-13 ENCOUNTER — HOSPITAL ENCOUNTER (OUTPATIENT)
Dept: RADIOLOGY | Facility: HOSPITAL | Age: 83
Discharge: HOME | End: 2018-12-13
Attending: INTERNAL MEDICINE
Payer: MEDICARE

## 2018-12-13 DIAGNOSIS — R07.9 CHEST PAIN IN ADULT: ICD-10-CM

## 2018-12-13 DIAGNOSIS — R06.00 EXERTIONAL DYSPNEA: ICD-10-CM

## 2018-12-13 DIAGNOSIS — I48.0 PAROXYSMAL A-FIB (HCC): ICD-10-CM

## 2018-12-13 PROCEDURE — 93306 TTE W/DOPPLER COMPLETE: CPT

## 2018-12-13 PROCEDURE — 78452 HT MUSCLE IMAGE SPECT MULT: CPT

## 2018-12-13 PROCEDURE — A9502 TC99M TETROFOSMIN: HCPCS

## 2018-12-13 PROCEDURE — 93017 CV STRESS TEST TRACING ONLY: CPT

## 2018-12-13 PROCEDURE — 93018 CV STRESS TEST I&R ONLY: CPT | Performed by: INTERNAL MEDICINE

## 2018-12-13 PROCEDURE — 93306 TTE W/DOPPLER COMPLETE: CPT | Performed by: INTERNAL MEDICINE

## 2018-12-13 PROCEDURE — 93016 CV STRESS TEST SUPVJ ONLY: CPT | Performed by: INTERNAL MEDICINE

## 2018-12-13 PROCEDURE — 78452 HT MUSCLE IMAGE SPECT MULT: CPT | Performed by: INTERNAL MEDICINE

## 2018-12-13 RX ADMIN — REGADENOSON 0.4 MG: 0.08 INJECTION, SOLUTION INTRAVENOUS at 10:20

## 2018-12-14 ENCOUNTER — TELEPHONE (OUTPATIENT)
Dept: CARDIOLOGY CLINIC | Facility: CLINIC | Age: 83
End: 2018-12-14

## 2018-12-14 LAB
CHEST PAIN STATEMENT: NORMAL
MAX DIASTOLIC BP: 74 MMHG
MAX HEART RATE: 96 BPM
MAX PREDICTED HEART RATE: 136 BPM
MAX. SYSTOLIC BP: 158 MMHG
PROTOCOL NAME: NORMAL
REASON FOR TERMINATION: NORMAL
TARGET HR FORMULA: NORMAL
TIME IN EXERCISE PHASE: NORMAL

## 2018-12-14 NOTE — TELEPHONE ENCOUNTER
----- Message from Roland Madden MD sent at 12/14/2018  9:20 AM EST -----  Stress test looks good   Will discuss in detail on follow-up

## 2018-12-27 ENCOUNTER — OFFICE VISIT (OUTPATIENT)
Dept: CARDIOLOGY CLINIC | Facility: CLINIC | Age: 83
End: 2018-12-27
Payer: MEDICARE

## 2018-12-27 VITALS
WEIGHT: 202 LBS | HEIGHT: 71 IN | OXYGEN SATURATION: 96 % | HEART RATE: 66 BPM | SYSTOLIC BLOOD PRESSURE: 126 MMHG | DIASTOLIC BLOOD PRESSURE: 58 MMHG | BODY MASS INDEX: 28.28 KG/M2

## 2018-12-27 DIAGNOSIS — I65.23 CAROTID STENOSIS, BILATERAL: ICD-10-CM

## 2018-12-27 DIAGNOSIS — R29.6 FREQUENT FALLS: ICD-10-CM

## 2018-12-27 DIAGNOSIS — I48.0 PAROXYSMAL ATRIAL FIBRILLATION (HCC): Primary | ICD-10-CM

## 2018-12-27 DIAGNOSIS — I49.5 SICK SINUS SYNDROME (HCC): ICD-10-CM

## 2018-12-27 PROCEDURE — 99214 OFFICE O/P EST MOD 30 MIN: CPT | Performed by: INTERNAL MEDICINE

## 2018-12-27 NOTE — PROGRESS NOTES
Cardiology Outpatient Consultation                                                           Rakesh Menezes  [id number]  1934      Consult for:Dyspnea  Appreciate consult by: Shannon Dunbar DO/ Dr Kurtis Pearl    1  Paroxysmal atrial fibrillation [facility])  Ambulatory referral to Physical Therapy   2  Sick sinus syndrome (HCC)     3  Carotid stenosis, bilateral     4  Frequent falls  Ambulatory referral to Physical Therapy       Discussion/Summary:  Pre-op- No cardiac contra-indication to GI procedure  Exertional Dyspnea/abnormal ekg- Negative stress test Bifascicular heart block 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 Follow-up with primary pending    HPI:  80-year-old gentleman with history of bifascicular heart block, paroxysmal atrial fibrillation previously on anticoagulation currently on aspirin, mild carotid artery disease presents for initial visitation  Reports having some exertional shortness of breath  No prior history of myocardial infarction or stent placement  States having some mild lower extremity edema  Reports a sedentary lifestyle after a fall  He reports his last fall was mechanical   Reports having unstable gait       12/27:  Reviewed recent testing  Has some PUD/discomfort  Still with unstable gait  Has not seen the balance center  PMH  Paroxysmal atrial fibrillation  Sick Sinus Syndrome  Mixed Hld  Htn  Carotid Stenosis- aspirin      Social Hx  Wife  Son [place]   Retired  Former smoker quit 1965      History reviewed  No pertinent past medical history    Social History     Social History    Marital status: /Civil [employer]     Spouse name: N/A    Number of children: N/A    Years of education: N/A     Occupational History    Not on file  Social History Main Topics    Smoking status: Former Smoker     Quit date: 1950    Smokeless tobacco: Never Used    Alcohol use No    Drug use: Unknown    Sexual activity: Not on file     Other Topics Concern    Not on file     Social History Narrative    No narrative on file      Family History   Problem Relation Age of Onset    No Known Problems Mother     No Known Problems Father      History reviewed  No pertinent surgical history      Current Outpatient Prescriptions:     amLODIPine-valsartan (EXFORGE) 5-160 MG per tablet, Take 1 tablet by mouth daily  , Disp: , Rfl:     aspirin (ECOTRIN LOW STRENGTH) 81 mg EC tablet, Take 81 mg by mouth daily, Disp: , Rfl:     Bioflavonoid Products (JK C PO), Take by mouth, Disp: , Rfl:     Calcium Carb-Cholecalciferol 600-1000 MG-UNIT CAPS, Take by mouth, Disp: , Rfl:     Cholecalciferol (VITAMIN D3) 400 units CAPS, Take by mouth daily  , Disp: , Rfl:     co-enzyme Q-10 100 mg capsule, Take 100 mg by mouth daily  , Disp: , Rfl:     DEXILANT 60 MG capsule, Take 60 mg by mouth daily, Disp: , Rfl:     Lutein 20 MG TABS, Take by mouth, Disp: , Rfl:     metoprolol succinate (TOPROL-XL) 25 mg 24 hr tablet, Take 25 mg by mouth daily, Disp: , Rfl:     multivitamin (THERAGRAN) TABS, Take 1 tablet by mouth daily, Disp: , Rfl:     omega-3-acid ethyl esters (LOVAZA) 1 g capsule, Take 2 g by mouth daily, Disp: , Rfl:     Probiotic Product (PROBIOTIC-10 PO), Take by mouth, Disp: , Rfl:     simvastatin (ZOCOR) 20 mg tablet, Take 20 mg by mouth daily, Disp: , Rfl:     SUPER B COMPLEX/C PO, Take by mouth, Disp: , Rfl:   No Known Allergies  Vitals:    12/27/18 1504 12/27/18 1507 12/27/18 1512   BP: 132/68 134/70 126/58   BP Location: Right arm Right arm Right arm   Patient Position: Supine Sitting Standing   Cuff Size: Standard Standard Standard   Pulse: 66 66 66   SpO2: 96%     Weight: 91 6 kg (202 lb)     Height: 5' 11 25" (1 81 m)         Review of Systems:  Review of Systems   Constitutional: Negative  HENT: Negative  Eyes: Negative  Respiratory: Positive for shortness of breath  Cardiovascular: Negative for chest pain  Gastrointestinal: Negative  Endocrine: Negative  Genitourinary: Negative  Musculoskeletal: Negative  Skin: Negative  Allergic/Immunologic: Negative  Neurological: Negative  Hematological: Negative  Psychiatric/Behavioral: Negative  Vitals:    12/27/18 1504 12/27/18 1507 12/27/18 1512   BP: 132/68 134/70 126/58   BP Location: Right arm Right arm Right arm   Patient Position: Supine Sitting Standing   Cuff Size: Standard Standard Standard   Pulse: 66 66 66   SpO2: 96%     Weight: 91 6 kg (202 lb)     Height: 5' 11 25" (1 81 m)       Physical Exam:  Physical Exam   Constitutional: He is oriented to person, place, and time  He appears well-developed and well-nourished  No distress  HENT:   Head: Normocephalic and atraumatic  Right Ear: External ear normal    Left Ear: External ear normal    Eyes: Pupils are equal, round, and reactive to light  Conjunctivae are normal  Right eye exhibits no discharge  Left eye exhibits no discharge  No scleral icterus  Neck: Normal range of motion  Neck supple  No JVD present  No tracheal deviation present  No thyromegaly present  Cardiovascular: Normal rate and regular rhythm  Exam reveals gallop  Exam reveals no friction rub  Murmur heard  Pulmonary/Chest: Effort normal and breath sounds normal  No stridor  No respiratory distress  He has no wheezes  He has no rales  He exhibits no tenderness  Abdominal: Soft  Bowel sounds are normal  He exhibits no distension and no mass  There is no tenderness  There is no rebound and no guarding  Musculoskeletal: Normal range of motion  He exhibits no edema, tenderness or deformity  Neurological: He is alert and oriented to person, place, and time  He has normal reflexes  No cranial nerve deficit  He exhibits normal muscle tone  Coordination normal    Skin: Skin is warm and dry  No rash noted  He is not diaphoretic  No erythema  No pallor  Psychiatric: He has a normal mood and affect  His behavior is normal  Judgment and thought content normal    Nursing note and vitals reviewed  Labs:   Pending    Imaging & Testing   I have personally reviewed pertinent reports  EKG: Personally reviewed  Normal sinus rhythm  Right bundle branch left anterior fascicular block  Cannot rule out septal infract    Cardiac testing:   No results found for this or any previous visit  Hilda Gilmore  Cell [phone]  Please call with any questions or suggestions    Counseling :  A description of the counseling:   Goals and Barriers:  Patient's ability to self care:  Medication side effect reviewed with patient in detail and all their questions answered  "This note has been constructed using a voice recognition system  Therefore there may be syntax, spelling, and/or grammatical errors   Please call if you have any questions  "

## 2018-12-28 ENCOUNTER — OFFICE VISIT (OUTPATIENT)
Dept: FAMILY MEDICINE CLINIC | Facility: CLINIC | Age: 83
End: 2018-12-28
Payer: MEDICARE

## 2018-12-28 VITALS
DIASTOLIC BLOOD PRESSURE: 78 MMHG | TEMPERATURE: 95.8 F | RESPIRATION RATE: 18 BRPM | WEIGHT: 200.6 LBS | BODY MASS INDEX: 28.08 KG/M2 | HEIGHT: 71 IN | SYSTOLIC BLOOD PRESSURE: 120 MMHG | HEART RATE: 84 BPM

## 2018-12-28 DIAGNOSIS — R60.9 EDEMA, UNSPECIFIED TYPE: ICD-10-CM

## 2018-12-28 DIAGNOSIS — I48.0 PAROXYSMAL ATRIAL FIBRILLATION (HCC): ICD-10-CM

## 2018-12-28 DIAGNOSIS — I65.23 CAROTID STENOSIS, BILATERAL: ICD-10-CM

## 2018-12-28 DIAGNOSIS — E78.5 HYPERLIPIDEMIA, UNSPECIFIED HYPERLIPIDEMIA TYPE: ICD-10-CM

## 2018-12-28 DIAGNOSIS — I10 BENIGN ESSENTIAL HYPERTENSION: Primary | ICD-10-CM

## 2018-12-28 DIAGNOSIS — R73.01 IFG (IMPAIRED FASTING GLUCOSE): ICD-10-CM

## 2018-12-28 PROBLEM — Z85.46 HISTORY OF PROSTATE CANCER: Status: ACTIVE | Noted: 2017-10-12

## 2018-12-28 PROBLEM — I25.10 CAD (CORONARY ARTERY DISEASE): Status: RESOLVED | Noted: 2018-11-25 | Resolved: 2018-12-28

## 2018-12-28 PROCEDURE — 99214 OFFICE O/P EST MOD 30 MIN: CPT | Performed by: FAMILY MEDICINE

## 2018-12-28 RX ORDER — AMLODIPINE AND VALSARTAN 5; 160 MG/1; MG/1
1 TABLET ORAL DAILY
Qty: 90 TABLET | Refills: 1 | Status: SHIPPED | OUTPATIENT
Start: 2018-12-28 | End: 2019-06-02 | Stop reason: SDUPTHER

## 2018-12-28 RX ORDER — METOPROLOL SUCCINATE 25 MG/1
25 TABLET, EXTENDED RELEASE ORAL DAILY
Qty: 90 TABLET | Refills: 1 | Status: SHIPPED | OUTPATIENT
Start: 2018-12-28 | End: 2019-08-16 | Stop reason: SDUPTHER

## 2018-12-28 RX ORDER — SIMVASTATIN 20 MG
20 TABLET ORAL DAILY
Qty: 90 TABLET | Refills: 1 | Status: SHIPPED | OUTPATIENT
Start: 2018-12-28 | End: 2019-07-02 | Stop reason: SDUPTHER

## 2018-12-28 NOTE — PROGRESS NOTES
Assessment/Plan:    No problem-specific Assessment & Plan notes found for this encounter  They can call for bp change if needed due to recall issues  Cont meds  Use of statins for the purposes of CVD/CVA risks reduction was advised according to USPSTF guidelines along with appropriate continued liver monitoring  No CAD  Echo/stress normal  PAF stable  IFG advised  q6m f/u aware     Diagnoses and all orders for this visit:    Benign essential hypertension  -     amLODIPine-valsartan (EXFORGE) 5-160 MG per tablet; Take 1 tablet by mouth daily  -     metoprolol succinate (TOPROL-XL) 25 mg 24 hr tablet; Take 1 tablet (25 mg total) by mouth daily  -     Comprehensive metabolic panel; Future    Hyperlipidemia, unspecified hyperlipidemia type  -     simvastatin (ZOCOR) 20 mg tablet; Take 1 tablet (20 mg total) by mouth daily    Edema, unspecified type    Carotid stenosis, bilateral  -     simvastatin (ZOCOR) 20 mg tablet; Take 1 tablet (20 mg total) by mouth daily    Paroxysmal atrial fibrillation (HCC)    IFG (impaired fasting glucose)  -     Comprehensive metabolic panel; Future        Had flu shot at [address]    Return in about 6 months (around 6/28/2019) for Recheck  Subjective:      Patient ID: Corina Marcum is a 80 y o  male  Chief Complaint   Patient presents with    Follow-up     ON TESTING AKRMA        HPI  Tolerating meds  Labs reviewed  ifg aware  Will work on [employer]  Aware of valsartan recall  Does have some leg edema  Sees cardiology  Echo/stress reviewed  Last cxr with cough was normal, printed for pt  Use of statins for the purposes of CVD/CVA risks reduction was advised according to USPSTF guidelines along with appropriate continued liver monitoring      The following portions of the patient's history were reviewed and updated as appropriate: allergies, current medications, past family history, past medical history, past social history, past surgical history and problem list     Review of Systems Respiratory: Negative for shortness of breath  Cardiovascular: Negative for chest pain  Current Outpatient Prescriptions   Medication Sig Dispense Refill    amLODIPine-valsartan (EXFORGE) 5-160 MG per tablet Take 1 tablet by mouth daily 90 tablet 1    aspirin (ECOTRIN LOW STRENGTH) 81 mg EC tablet Take 81 mg by mouth daily      Bioflavonoid Products (KJ C PO) Take by mouth      Calcium Carb-Cholecalciferol 600-1000 MG-UNIT CAPS Take by mouth      Cholecalciferol (VITAMIN D3) 400 units CAPS Take by mouth daily        co-enzyme Q-10 100 mg capsule Take 100 mg by mouth daily        DEXILANT 60 MG capsule Take 60 mg by mouth daily      Lutein 20 MG TABS Take by mouth      metoprolol succinate (TOPROL-XL) 25 mg 24 hr tablet Take 1 tablet (25 mg total) by mouth daily 90 tablet 1    multivitamin (THERAGRAN) TABS Take 1 tablet by mouth daily      omega-3-acid ethyl esters (LOVAZA) 1 g capsule Take 2 g by mouth daily      Probiotic Product (PROBIOTIC-10 PO) Take by mouth      simvastatin (ZOCOR) 20 mg tablet Take 1 tablet (20 mg total) by mouth daily 90 tablet 1    SUPER B COMPLEX/C PO Take by mouth       No current facility-administered medications for this visit  Objective:    /78   Pulse 84   Temp (!) 95 8 °F (35 4 °C)   Resp 18   Ht 5' 11 25" (1 81 m)   Wt 91 kg (200 lb 9 6 oz)   BMI 27 78 kg/m²        Physical Exam   Constitutional: He appears well-developed  No distress  HENT:   Head: Normocephalic  Mouth/Throat: No oropharyngeal exudate  Eyes: Conjunctivae are normal  No scleral icterus  Neck: Neck supple  Cardiovascular: Normal rate and intact distal pulses  No murmur heard  Pulmonary/Chest: Effort normal  No respiratory distress  He has no wheezes  Abdominal: Soft  He exhibits no distension  There is no tenderness  Musculoskeletal: He exhibits no edema or deformity  Lymphadenopathy:     He has no cervical adenopathy  Neurological: He is alert  Skin: Skin is warm and dry  No rash noted  No pallor  Psychiatric: His behavior is normal  Thought content normal    Nursing note and vitals reviewed               kOsana Omer DO

## 2019-02-25 ENCOUNTER — OFFICE VISIT (OUTPATIENT)
Dept: PODIATRY | Facility: CLINIC | Age: 84
End: 2019-02-25
Payer: COMMERCIAL

## 2019-02-25 VITALS
SYSTOLIC BLOOD PRESSURE: 166 MMHG | DIASTOLIC BLOOD PRESSURE: 71 MMHG | WEIGHT: 200 LBS | BODY MASS INDEX: 27.09 KG/M2 | HEIGHT: 72 IN

## 2019-02-25 DIAGNOSIS — L03.032 PARONYCHIA OF TOENAIL OF LEFT FOOT: ICD-10-CM

## 2019-02-25 DIAGNOSIS — L60.0 INGROWN TOENAIL: ICD-10-CM

## 2019-02-25 DIAGNOSIS — B35.1 ONYCHOMYCOSIS: ICD-10-CM

## 2019-02-25 DIAGNOSIS — M79.671 PAIN IN BOTH FEET: ICD-10-CM

## 2019-02-25 DIAGNOSIS — B35.3 TINEA PEDIS OF BOTH FEET: ICD-10-CM

## 2019-02-25 DIAGNOSIS — M79.672 PAIN IN BOTH FEET: ICD-10-CM

## 2019-02-25 DIAGNOSIS — I70.209 PERIPHERAL ARTERIOSCLEROSIS (HCC): Primary | ICD-10-CM

## 2019-02-25 PROCEDURE — 99202 OFFICE O/P NEW SF 15 MIN: CPT | Performed by: PODIATRIST

## 2019-02-25 RX ORDER — KETOCONAZOLE 20 MG/G
CREAM TOPICAL DAILY
Qty: 60 G | Refills: 1 | Status: SHIPPED | OUTPATIENT
Start: 2019-02-25 | End: 2020-01-08

## 2019-02-25 RX ORDER — TERBINAFINE HYDROCHLORIDE 250 MG/1
TABLET ORAL
Qty: 15 TABLET | Refills: 0 | Status: SHIPPED | OUTPATIENT
Start: 2019-02-25 | End: 2019-03-25

## 2019-04-08 ENCOUNTER — OFFICE VISIT (OUTPATIENT)
Dept: PODIATRY | Facility: CLINIC | Age: 84
End: 2019-04-08
Payer: COMMERCIAL

## 2019-04-08 VITALS
SYSTOLIC BLOOD PRESSURE: 138 MMHG | HEIGHT: 72 IN | WEIGHT: 200 LBS | RESPIRATION RATE: 16 BRPM | HEART RATE: 77 BPM | BODY MASS INDEX: 27.09 KG/M2 | DIASTOLIC BLOOD PRESSURE: 78 MMHG

## 2019-04-08 DIAGNOSIS — M79.671 PAIN IN BOTH FEET: ICD-10-CM

## 2019-04-08 DIAGNOSIS — B35.1 ONYCHOMYCOSIS: ICD-10-CM

## 2019-04-08 DIAGNOSIS — I70.209 PERIPHERAL ARTERIOSCLEROSIS (HCC): Primary | ICD-10-CM

## 2019-04-08 DIAGNOSIS — M79.672 PAIN IN BOTH FEET: ICD-10-CM

## 2019-04-08 PROCEDURE — 11721 DEBRIDE NAIL 6 OR MORE: CPT | Performed by: PODIATRIST

## 2019-05-26 NOTE — PROGRESS NOTES
Assessment/Plan:  Pain upon ambulation  Peripheral artery disease  Ingrown toenail left hallux  Mycosis of nail and skin  Plan  Foot exam performed  Patient family educated on condition  All nails debrided  We will start both oral and topical antifungal agent  No problem-specific Assessment & Plan notes found for this encounter  There are no diagnoses linked to this encounter  Subjective:  Patient complains of pain in his left big toe  He is concerned about the thickening of the nail  He also has pain with shoe wear  No history of trauma    No past medical history on file  No past surgical history on file      No Known Allergies      Current Outpatient Medications:     amLODIPine-valsartan (EXFORGE) 5-160 MG per tablet, Take 1 tablet by mouth daily, Disp: 90 tablet, Rfl: 1    aspirin (ECOTRIN LOW STRENGTH) 81 mg EC tablet, Take 81 mg by mouth daily, Disp: , Rfl:     Bioflavonoid Products (KJ C PO), Take by mouth, Disp: , Rfl:     Calcium Carb-Cholecalciferol 600-1000 MG-UNIT CAPS, Take by mouth, Disp: , Rfl:     Cholecalciferol (VITAMIN D3) 400 units CAPS, Take by mouth daily  , Disp: , Rfl:     co-enzyme Q-10 100 mg capsule, Take 100 mg by mouth daily  , Disp: , Rfl:     DEXILANT 60 MG capsule, Take 60 mg by mouth daily, Disp: , Rfl:     Lutein 20 MG TABS, Take by mouth, Disp: , Rfl:     metoprolol succinate (TOPROL-XL) 25 mg 24 hr tablet, Take 1 tablet (25 mg total) by mouth daily, Disp: 90 tablet, Rfl: 1    multivitamin (THERAGRAN) TABS, Take 1 tablet by mouth daily, Disp: , Rfl:     omega-3-acid ethyl esters (LOVAZA) 1 g capsule, Take 2 g by mouth daily, Disp: , Rfl:     Probiotic Product (PROBIOTIC-10 PO), Take by mouth, Disp: , Rfl:     simvastatin (ZOCOR) 20 mg tablet, Take 1 tablet (20 mg total) by mouth daily, Disp: 90 tablet, Rfl: 1    SUPER B COMPLEX/C PO, Take by mouth, Disp: , Rfl:     Patient Active Problem List   Diagnosis    Basal cell carcinoma of skin Patient is stable for discharge at this time, anticipatory guidance provided, close follow-up is encouraged, and strict ED return instructions have been detailed. Patient is agreeable to the disposition and plan  and discharged home in ambulatory state and in good condition.     of scalp and neck    Carotid stenosis, bilateral    Chronic fatigue syndrome    Chronic pain of left knee    Edema    Benign essential hypertension    Fatigue    Heart murmur    Hyperlipidemia    Inguinal hernia    Lumbosacral radiculopathy    Muscle weakness    Paroxysmal atrial fibrillation (HCC)    History of prostate cancer    Short-term memory loss    Sick sinus syndrome (HCC)    Traumatic hemorrhage of cerebrum without loss of consciousness (HCC)    Exertional dyspnea    GERD (gastroesophageal reflux disease)    Basal cell carcinoma (BCC) of skin of nose    Medicare annual wellness visit, initial    IFG (impaired fasting glucose)          Patient ID: Amanda Ochoa is a 80 y o  male  HPI    The following portions of the patient's history were reviewed and updated as appropriate: He  has no past medical history on file  He   Patient Active Problem List    Diagnosis Date Noted    IFG (impaired fasting glucose) 12/28/2018    GERD (gastroesophageal reflux disease) 11/28/2018    Basal cell carcinoma (BCC) of skin of nose 11/28/2018    Medicare annual wellness visit, initial 11/28/2018    Exertional dyspnea 11/26/2018    Fatigue 11/25/2018    Paroxysmal atrial fibrillation ([facility] [address] ) 11/25/2018    Sick sinus syndrome ([facility] [address] ) 11/25/2018    Lumbosacral radiculopathy 04/09/2018    Traumatic hemorrhage of cerebrum without loss of consciousness ([facility] [address] ) 03/11/2018    Muscle weakness 02/27/2018    History of prostate cancer 10/12/2017    Chronic pain of left knee 06/20/2017    Benign essential hypertension 05/05/2016    Short-term memory loss 05/05/2016    Edema 03/31/2016    Carotid stenosis, bilateral 02/11/2016    Basal cell carcinoma of skin of scalp and neck 10/26/2015    Hyperlipidemia 10/15/2014    Chronic fatigue syndrome 09/15/2014    Heart murmur 03/26/2014    Inguinal hernia 06/28/2013     He  has no past surgical history on file    His family history includes No Known Problems in his father and mother  He  reports that he quit smoking about 69 years ago  He has never used smokeless tobacco  He reports that he does not drink alcohol  His drug history is not on file  Current Outpatient Medications   Medication Sig Dispense Refill    amLODIPine-valsartan (EXFORGE) 5-160 MG per tablet Take 1 tablet by mouth daily 90 tablet 1    aspirin (ECOTRIN LOW STRENGTH) 81 mg EC tablet Take 81 mg by mouth daily      Bioflavonoid Products (KJ C PO) Take by mouth      Calcium Carb-Cholecalciferol 600-1000 MG-UNIT CAPS Take by mouth      Cholecalciferol (VITAMIN D3) 400 units CAPS Take by mouth daily        co-enzyme Q-10 100 mg capsule Take 100 mg by mouth daily        DEXILANT 60 MG capsule Take 60 mg by mouth daily      Lutein 20 MG TABS Take by mouth      metoprolol succinate (TOPROL-XL) 25 mg 24 hr tablet Take 1 tablet (25 mg total) by mouth daily 90 tablet 1    multivitamin (THERAGRAN) TABS Take 1 tablet by mouth daily      omega-3-acid ethyl esters (LOVAZA) 1 g capsule Take 2 g by mouth daily      Probiotic Product (PROBIOTIC-10 PO) Take by mouth      simvastatin (ZOCOR) 20 mg tablet Take 1 tablet (20 mg total) by mouth daily 90 tablet 1    SUPER B COMPLEX/C PO Take by mouth       No current facility-administered medications for this visit        Current Outpatient Medications on File Prior to Visit   Medication Sig    amLODIPine-valsartan (EXFORGE) 5-160 MG per tablet Take 1 tablet by mouth daily    aspirin (ECOTRIN LOW STRENGTH) 81 mg EC tablet Take 81 mg by mouth daily    Bioflavonoid Products (KJ C PO) Take by mouth    Calcium Carb-Cholecalciferol 600-1000 MG-UNIT CAPS Take by mouth    Cholecalciferol (VITAMIN D3) 400 units CAPS Take by mouth daily      co-enzyme Q-10 100 mg capsule Take 100 mg by mouth daily      DEXILANT 60 MG capsule Take 60 mg by mouth daily    Lutein 20 MG TABS Take by mouth    metoprolol succinate (TOPROL-XL) 25 mg 24 hr tablet Take 1 tablet (25 mg total) by mouth daily    multivitamin (THERAGRAN) TABS Take 1 tablet by mouth daily    omega-3-acid ethyl esters (LOVAZA) 1 g capsule Take 2 g by mouth daily    Probiotic Product (PROBIOTIC-10 PO) Take by mouth    simvastatin (ZOCOR) 20 mg tablet Take 1 tablet (20 mg total) by mouth daily    SUPER B COMPLEX/C PO Take by mouth     No current facility-administered medications on file prior to visit  He has No Known Allergies       Vitals:    02/25/19 1310   BP: 166/71       Review of Systems      Objective:  Patient's shoes and socks removed  Foot Exam    General  General Appearance: appears stated age and healthy   Orientation: alert and oriented to person, place, and time   Affect: appropriate   Gait: antalgic       Right Foot/Ankle     Inspection and Palpation  Ecchymosis: none  Swelling: dorsum and metatarsals   Arch: pes planus  Hammertoes: fifth toe  Hallux valgus: no  Hallux limitus: yes  Skin Exam: dry skin and tinea; Neurovascular  Dorsalis pedis: 1+  Posterior tibial: 1+  Superficial peroneal nerve sensation: diminished  Deep peroneal nerve sensation: diminished      Left Foot/Ankle      Inspection and Palpation  Ecchymosis: none  Swelling: dorsum and metatarsals   Arch: pes planus  Hammertoes: fifth toe  Hallux valgus: no  Hallux limitus: yes  Skin Exam: dry skin and tinea; Neurovascular  Dorsalis pedis: 1+  Superficial peroneal nerve sensation: diminished  Deep peroneal nerve sensation: diminished        Physical Exam   Constitutional: He appears well-developed and well-nourished  Cardiovascular: Normal rate and regular rhythm  Pulses:       Dorsalis pedis pulses are 1+ on the right side, and 1+ on the left side  Posterior tibial pulses are 1+ on the right side  Feet:   Right Foot:   Skin Integrity: Positive for dry skin  Left Foot:   Skin Integrity: Positive for dry skin  Skin: Skin is dry  Capillary refill takes more than 3 seconds     Significant xerosis of skin noted bilateral   This is secondary to chronic moccasin tinea pedis  All nails are dystrophic left hallux is ingrown in the fibular aspect  Both hallux nails demonstrate mycosis   Vitals reviewed

## 2019-06-02 DIAGNOSIS — I10 BENIGN ESSENTIAL HYPERTENSION: ICD-10-CM

## 2019-06-03 RX ORDER — AMLODIPINE AND VALSARTAN 5; 160 MG/1; MG/1
1 TABLET ORAL DAILY
Qty: 90 TABLET | Refills: 1 | Status: SHIPPED | OUTPATIENT
Start: 2019-06-03 | End: 2019-10-21 | Stop reason: SDUPTHER

## 2019-06-24 ENCOUNTER — OFFICE VISIT (OUTPATIENT)
Dept: PODIATRY | Facility: CLINIC | Age: 84
End: 2019-06-24
Payer: COMMERCIAL

## 2019-06-24 VITALS
HEART RATE: 60 BPM | RESPIRATION RATE: 17 BRPM | DIASTOLIC BLOOD PRESSURE: 77 MMHG | WEIGHT: 200 LBS | HEIGHT: 72 IN | SYSTOLIC BLOOD PRESSURE: 133 MMHG | BODY MASS INDEX: 27.09 KG/M2

## 2019-06-24 DIAGNOSIS — M79.672 PAIN IN BOTH FEET: ICD-10-CM

## 2019-06-24 DIAGNOSIS — M79.671 PAIN IN BOTH FEET: ICD-10-CM

## 2019-06-24 DIAGNOSIS — I70.209 PERIPHERAL ARTERIOSCLEROSIS (HCC): Primary | ICD-10-CM

## 2019-06-24 DIAGNOSIS — B35.1 ONYCHOMYCOSIS: ICD-10-CM

## 2019-06-24 PROCEDURE — 11721 DEBRIDE NAIL 6 OR MORE: CPT | Performed by: PODIATRIST

## 2019-07-02 DIAGNOSIS — I65.23 CAROTID STENOSIS, BILATERAL: ICD-10-CM

## 2019-07-02 DIAGNOSIS — E78.5 HYPERLIPIDEMIA, UNSPECIFIED HYPERLIPIDEMIA TYPE: ICD-10-CM

## 2019-07-03 RX ORDER — SIMVASTATIN 20 MG
TABLET ORAL
Qty: 90 TABLET | Refills: 1 | Status: SHIPPED | OUTPATIENT
Start: 2019-07-03 | End: 2019-11-20 | Stop reason: SDUPTHER

## 2019-07-07 PROBLEM — B35.3 TINEA PEDIS OF BOTH FEET: Status: RESOLVED | Noted: 2019-02-25 | Resolved: 2019-07-07

## 2019-07-07 PROBLEM — B35.1 ONYCHOMYCOSIS: Status: RESOLVED | Noted: 2019-02-25 | Resolved: 2019-07-07

## 2019-07-07 PROBLEM — L60.0 INGROWN TOENAIL: Status: RESOLVED | Noted: 2019-02-25 | Resolved: 2019-07-07

## 2019-07-07 PROBLEM — M79.671 PAIN IN BOTH FEET: Status: RESOLVED | Noted: 2019-02-25 | Resolved: 2019-07-07

## 2019-07-07 PROBLEM — L03.032 PARONYCHIA OF TOENAIL OF LEFT FOOT: Status: RESOLVED | Noted: 2019-02-25 | Resolved: 2019-07-07

## 2019-07-07 PROBLEM — M79.672 PAIN IN BOTH FEET: Status: RESOLVED | Noted: 2019-02-25 | Resolved: 2019-07-07

## 2019-07-07 RX ORDER — MEMANTINE HYDROCHLORIDE 10 MG/1
10 TABLET ORAL
COMMUNITY
End: 2020-01-08

## 2019-07-08 ENCOUNTER — OFFICE VISIT (OUTPATIENT)
Dept: FAMILY MEDICINE CLINIC | Facility: CLINIC | Age: 84
End: 2019-07-08
Payer: COMMERCIAL

## 2019-07-08 VITALS
RESPIRATION RATE: 16 BRPM | TEMPERATURE: 95.6 F | HEART RATE: 64 BPM | DIASTOLIC BLOOD PRESSURE: 80 MMHG | BODY MASS INDEX: 27.36 KG/M2 | WEIGHT: 202 LBS | HEIGHT: 72 IN | SYSTOLIC BLOOD PRESSURE: 128 MMHG

## 2019-07-08 DIAGNOSIS — E78.5 HYPERLIPIDEMIA, UNSPECIFIED HYPERLIPIDEMIA TYPE: ICD-10-CM

## 2019-07-08 DIAGNOSIS — R73.01 IFG (IMPAIRED FASTING GLUCOSE): ICD-10-CM

## 2019-07-08 DIAGNOSIS — I10 BENIGN ESSENTIAL HYPERTENSION: Primary | ICD-10-CM

## 2019-07-08 DIAGNOSIS — I65.23 CAROTID STENOSIS, BILATERAL: ICD-10-CM

## 2019-07-08 PROCEDURE — 99214 OFFICE O/P EST MOD 30 MIN: CPT | Performed by: FAMILY MEDICINE

## 2019-07-08 PROCEDURE — 3074F SYST BP LT 130 MM HG: CPT | Performed by: FAMILY MEDICINE

## 2019-07-08 NOTE — PROGRESS NOTES
Assessment/Plan:    No problem-specific Assessment & Plan notes found for this encounter     htn stable  ifg aware  TLC advised  Cont exforge but recalls discussed if wants to change but will need f/u after the change  Use of statins for the purposes of CVD/CVA risks reduction was advised according to USPSTF guidelines along with appropriate continued liver monitoring  Cont statin, no myalgias    BMI Counseling: Body mass index is 27 78 kg/m²  Discussed the patient's BMI with him  The BMI is above average  BMI counseling and education was provided to the patient  Nutrition recommendations include decreasing overall calorie intake  Diagnoses and all orders for this visit:    Benign essential hypertension    Hyperlipidemia, unspecified hyperlipidemia type    IFG (impaired fasting glucose)    Carotid stenosis, bilateral    Other orders  -     memantine (NAMENDA) 10 mg tablet; Take 10 mg by mouth          Return in about 6 months (around 1/8/2020) for Recheck  Subjective:      Patient ID: Lyric Jones is a 80 y o  male  Chief Complaint   Patient presents with    Follow-up     6m [id number]    Discuss medication     Amlodipine- Valsartan       HPI  No side effect from prior bp meds  Concerned about ARB  No cough  Saw dr Audrey Cox  No major falls  No leg edema  Did not get labs yet    The following portions of the patient's history were reviewed and updated as appropriate: allergies, current medications, past family history, past medical history, past social history, past surgical history and problem list     Review of Systems   Respiratory: Negative for shortness of breath  Cardiovascular: Negative for chest pain  Neurological: Negative for syncope           Current Outpatient Medications   Medication Sig Dispense Refill    amLODIPine-valsartan (EXFORGE) 5-160 MG per tablet TAKE 1 TABLET BY MOUTH  DAILY 90 tablet 1    aspirin (ECOTRIN LOW STRENGTH) 81 mg EC tablet Take 81 mg by mouth daily      Bioflavonoid Products (KJ C PO) Take by mouth      Calcium Carb-Cholecalciferol 600-1000 MG-UNIT CAPS Take by mouth      Cholecalciferol (VITAMIN D3) 400 units CAPS Take by mouth daily        co-enzyme Q-10 100 mg capsule Take 100 mg by mouth daily        DEXILANT 60 MG capsule Take 60 mg by mouth daily      Lutein 20 MG TABS Take by mouth      metoprolol succinate (TOPROL-XL) 25 mg 24 hr tablet Take 1 tablet (25 mg total) by mouth daily 90 tablet 1    multivitamin (THERAGRAN) TABS Take 1 tablet by mouth daily      omega-3-acid ethyl esters (LOVAZA) 1 g capsule Take 2 g by mouth daily      Probiotic Product (PROBIOTIC-10 PO) Take by mouth      simvastatin (ZOCOR) 20 mg tablet TAKE 1 TABLET BY MOUTH  DAILY 90 tablet 1    SUPER B COMPLEX/C PO Take by mouth      ketoconazole (NIZORAL) 2 % cream Apply topically daily for 30 days (Patient not taking: Reported on 7/8/2019) 60 g 1    memantine (NAMENDA) 10 mg tablet Take 10 mg by mouth       No current facility-administered medications for this visit  Objective:    /80   Pulse 64   Temp (!) 95 6 °F (35 3 °C)   Resp 16   Ht 5' 11 5" (1 816 m)   Wt 91 6 kg (202 lb)   BMI 27 78 kg/m²        Physical Exam   Constitutional: He appears well-developed  No distress  HENT:   Head: Normocephalic  Right Ear: External ear normal    Left Ear: External ear normal    Mouth/Throat: Oropharynx is clear and moist    Eyes: Conjunctivae are normal    Neck: Neck supple  Carotid bruit is not present  No tracheal deviation present  No thyromegaly present  Cardiovascular: Normal rate, regular rhythm, normal heart sounds and intact distal pulses  No murmur heard  Pulmonary/Chest: Effort normal  No respiratory distress  He has no wheezes  He has no rales  Abdominal: Soft  Bowel sounds are normal  He exhibits no distension  There is no tenderness  Musculoskeletal: He exhibits no edema or deformity  Neurological: He is alert     Skin: Skin is warm and dry  No rash noted  No pallor  Psychiatric: His behavior is normal  Thought content normal    Nursing note and vitals reviewed               Silvano Asencio DO

## 2019-07-08 NOTE — PATIENT INSTRUCTIONS
You can continue the valsartan combo pill but if you are interested in switching to something completely different, we can give you Amlodipine and another medication called LISINOPRIL to be taken as two separate medication  If we do this, we should recheck you in 4 weeks of the change

## 2019-07-12 LAB
ALBUMIN SERPL-MCNC: 4.3 G/DL (ref 3.5–4.7)
ALBUMIN/GLOB SERPL: 1.7 {RATIO} (ref 1.2–2.2)
ALP SERPL-CCNC: 73 IU/L (ref 39–117)
ALT SERPL-CCNC: 13 IU/L (ref 0–44)
AST SERPL-CCNC: 25 IU/L (ref 0–40)
BILIRUB SERPL-MCNC: 0.6 MG/DL (ref 0–1.2)
BUN SERPL-MCNC: 14 MG/DL (ref 8–27)
BUN/CREAT SERPL: 13 (ref 10–24)
CALCIUM SERPL-MCNC: 9.2 MG/DL (ref 8.6–10.2)
CHLORIDE SERPL-SCNC: 101 MMOL/L (ref 96–106)
CO2 SERPL-SCNC: 23 MMOL/L (ref 20–29)
CREAT SERPL-MCNC: 1.12 MG/DL (ref 0.76–1.27)
GLOBULIN SER-MCNC: 2.5 G/DL (ref 1.5–4.5)
GLUCOSE SERPL-MCNC: 101 MG/DL (ref 65–99)
POTASSIUM SERPL-SCNC: 4.6 MMOL/L (ref 3.5–5.2)
PROT SERPL-MCNC: 6.8 G/DL (ref 6–8.5)
SL AMB EGFR AFRICAN AMERICAN: 69 ML/MIN/1.73
SL AMB EGFR NON AFRICAN AMERICAN: 60 ML/MIN/1.73
SODIUM SERPL-SCNC: 137 MMOL/L (ref 134–144)

## 2019-08-16 DIAGNOSIS — I10 BENIGN ESSENTIAL HYPERTENSION: ICD-10-CM

## 2019-08-17 RX ORDER — METOPROLOL SUCCINATE 25 MG/1
TABLET, EXTENDED RELEASE ORAL
Qty: 90 TABLET | Refills: 1 | Status: SHIPPED | OUTPATIENT
Start: 2019-08-17 | End: 2020-01-05

## 2019-08-26 ENCOUNTER — OFFICE VISIT (OUTPATIENT)
Dept: PODIATRY | Facility: CLINIC | Age: 84
End: 2019-08-26
Payer: COMMERCIAL

## 2019-08-26 VITALS
DIASTOLIC BLOOD PRESSURE: 80 MMHG | HEIGHT: 72 IN | WEIGHT: 202 LBS | HEART RATE: 79 BPM | BODY MASS INDEX: 27.36 KG/M2 | SYSTOLIC BLOOD PRESSURE: 132 MMHG | RESPIRATION RATE: 17 BRPM

## 2019-08-26 DIAGNOSIS — M79.671 PAIN IN BOTH FEET: ICD-10-CM

## 2019-08-26 DIAGNOSIS — B35.1 ONYCHOMYCOSIS: ICD-10-CM

## 2019-08-26 DIAGNOSIS — M79.672 PAIN IN BOTH FEET: ICD-10-CM

## 2019-08-26 DIAGNOSIS — I70.209 PERIPHERAL ARTERIOSCLEROSIS (HCC): Primary | ICD-10-CM

## 2019-08-26 PROCEDURE — 11721 DEBRIDE NAIL 6 OR MORE: CPT | Performed by: PODIATRIST

## 2019-08-26 NOTE — PROGRESS NOTES
Assessment/Plan:  Pain upon ambulation   Peripheral artery disease   Ingrown toenail left hallux   Mycosis of nail and skin      Plan   Foot exam performed   Patient family educated on condition   All nails debrided           Subjective:  Patient complains of pain in his left big toe   He is concerned about the thickening of the nail   He also has pain with shoe wear   No history of trauma     No past medical history on file      No past surgical history on file      No Known Allergies        Current Outpatient Medications:     amLODIPine-valsartan (EXFORGE) 5-160 MG per tablet, Take 1 tablet by mouth daily, Disp: 90 tablet, Rfl: 1    aspirin (ECOTRIN LOW STRENGTH) 81 mg EC tablet, Take 81 mg by mouth daily, Disp: , Rfl:     Bioflavonoid Products (KJ C PO), Take by mouth, Disp: , Rfl:     Calcium Carb-Cholecalciferol 600-1000 MG-UNIT CAPS, Take by mouth, Disp: , Rfl:     Cholecalciferol (VITAMIN D3) 400 units CAPS, Take by mouth daily  , Disp: , Rfl:     co-enzyme Q-10 100 mg capsule, Take 100 mg by mouth daily  , Disp: , Rfl:     DEXILANT 60 MG capsule, Take 60 mg by mouth daily, Disp: , Rfl:     Lutein 20 MG TABS, Take by mouth, Disp: , Rfl:     metoprolol succinate (TOPROL-XL) 25 mg 24 hr tablet, Take 1 tablet (25 mg total) by mouth daily, Disp: 90 tablet, Rfl: 1    multivitamin (THERAGRAN) TABS, Take 1 tablet by mouth daily, Disp: , Rfl:     omega-3-acid ethyl esters (LOVAZA) 1 g capsule, Take 2 g by mouth daily, Disp: , Rfl:     Probiotic Product (PROBIOTIC-10 PO), Take by mouth, Disp: , Rfl:     simvastatin (ZOCOR) 20 mg tablet, Take 1 tablet (20 mg total) by mouth daily, Disp: 90 tablet, Rfl: 1    SUPER B COMPLEX/C PO, Take by mouth, Disp: , Rfl:            Patient Active Problem List   Diagnosis    Basal cell carcinoma of skin of scalp and neck    Carotid stenosis, bilateral    Chronic fatigue syndrome    Chronic pain of left knee    Edema    Benign essential hypertension    Fatigue  Heart murmur    Hyperlipidemia    Inguinal hernia    Lumbosacral radiculopathy    Muscle weakness    Paroxysmal atrial fibrillation (HCC)    History of prostate cancer    Short-term memory loss    Sick sinus syndrome (HCC)    Traumatic hemorrhage of cerebrum without loss of consciousness (HCC)    Exertional dyspnea    GERD (gastroesophageal reflux disease)    Basal cell carcinoma (BCC) of skin of nose    Medicare annual wellness visit, initial    IFG (impaired fasting glucose)             Patient ID: Ronaldo Barcenas is a 80 y  o  male      HPI     The following portions of the patient's history were reviewed and updated as appropriate: He  has no past medical history on file  He           Patient Active Problem List     Diagnosis Date Noted    IFG (impaired fasting glucose) 12/28/2018    GERD (gastroesophageal reflux disease) 11/28/2018    Basal cell carcinoma (BCC) of skin of nose 11/28/2018    Medicare annual wellness visit, initial 11/28/2018    Exertional dyspnea 11/26/2018    Fatigue 11/25/2018    Paroxysmal atrial fibrillation ([facility] [address] ) 11/25/2018    Sick sinus syndrome ([facility] [address] ) 11/25/2018    Lumbosacral radiculopathy 04/09/2018    Traumatic hemorrhage of cerebrum without loss of consciousness ([facility] [address] ) 03/11/2018    Muscle weakness 02/27/2018    History of prostate cancer 10/12/2017    Chronic pain of left knee 06/20/2017    Benign essential hypertension 05/05/2016    Short-term memory loss 05/05/2016    Edema 03/31/2016    Carotid stenosis, bilateral 02/11/2016    Basal cell carcinoma of skin of scalp and neck 10/26/2015    Hyperlipidemia 10/15/2014    Chronic fatigue syndrome 09/15/2014    Heart murmur 03/26/2014    Inguinal hernia 06/28/2013      He  has no past surgical history on file  His family history includes No Known Problems in his father and mother  He  reports that he quit smoking about 69 years ago   He has never used smokeless tobacco  He reports that he does not drink alcohol   His drug history is not on file               Current Outpatient Medications   Medication Sig Dispense Refill    amLODIPine-valsartan (EXFORGE) 5-160 MG per tablet Take 1 tablet by mouth daily 90 tablet 1    aspirin (ECOTRIN LOW STRENGTH) 81 mg EC tablet Take 81 mg by mouth daily        Bioflavonoid Products (KJ C PO) Take by mouth        Calcium Carb-Cholecalciferol 600-1000 MG-UNIT CAPS Take by mouth        Cholecalciferol (VITAMIN D3) 400 units CAPS Take by mouth daily          co-enzyme Q-10 100 mg capsule Take 100 mg by mouth daily          DEXILANT 60 MG capsule Take 60 mg by mouth daily        Lutein 20 MG TABS Take by mouth        metoprolol succinate (TOPROL-XL) 25 mg 24 hr tablet Take 1 tablet (25 mg total) by mouth daily 90 tablet 1    multivitamin (THERAGRAN) TABS Take 1 tablet by mouth daily        omega-3-acid ethyl esters (LOVAZA) 1 g capsule Take 2 g by mouth daily        Probiotic Product (PROBIOTIC-10 PO) Take by mouth        simvastatin (ZOCOR) 20 mg tablet Take 1 tablet (20 mg total) by mouth daily 90 tablet 1    SUPER B COMPLEX/C PO Take by mouth          No current facility-administered medications for this visit                Current Outpatient Medications on File Prior to Visit   Medication Sig    amLODIPine-valsartan (EXFORGE) 5-160 MG per tablet Take 1 tablet by mouth daily    aspirin (ECOTRIN LOW STRENGTH) 81 mg EC tablet Take 81 mg by mouth daily    Bioflavonoid Products (KJ C PO) Take by mouth    Calcium Carb-Cholecalciferol 600-1000 MG-UNIT CAPS Take by mouth    Cholecalciferol (VITAMIN D3) 400 units CAPS Take by mouth daily      co-enzyme Q-10 100 mg capsule Take 100 mg by mouth daily      DEXILANT 60 MG capsule Take 60 mg by mouth daily    Lutein 20 MG TABS Take by mouth    metoprolol succinate (TOPROL-XL) 25 mg 24 hr tablet Take 1 tablet (25 mg total) by mouth daily    multivitamin (THERAGRAN) TABS Take 1 tablet by mouth daily    omega-3-acid ethyl esters (LOVAZA) 1 g capsule Take 2 g by mouth daily    Probiotic Product (PROBIOTIC-10 PO) Take by mouth    simvastatin (ZOCOR) 20 mg tablet Take 1 tablet (20 mg total) by mouth daily    SUPER B COMPLEX/C PO Take by mouth      No current facility-administered medications on file prior to visit        He has No Known Allergies              Vitals:     02/25/19 1310   BP: 166/71         Review of Systems       Objective:  Patient's shoes and socks removed    Foot Exam     General  General Appearance: appears stated age and healthy   Orientation: alert and oriented to person, place, and time   Affect: appropriate   Gait: antalgic         Right Foot/Ankle      Inspection and Palpation  Ecchymosis: none  Swelling: dorsum and metatarsals   Arch: pes planus  Hammertoes: fifth toe  Hallux valgus: no  Hallux limitus: yes  Skin Exam: dry skin and tinea;      Neurovascular  Dorsalis pedis: 1+  Posterior tibial: 1+  Superficial peroneal nerve sensation: diminished  Deep peroneal nerve sensation: diminished        Left Foot/Ankle       Inspection and Palpation  Ecchymosis: none  Swelling: dorsum and metatarsals   Arch: pes planus  Hammertoes: fifth toe  Hallux valgus: no  Hallux limitus: yes  Skin Exam: dry skin and tinea;      Neurovascular  Dorsalis pedis: 1+  Superficial peroneal nerve sensation: diminished  Deep peroneal nerve sensation: diminished           Physical Exam   Constitutional: He appears well-developed and well-nourished  Cardiovascular: Normal rate and regular rhythm  Pulses:       Dorsalis pedis pulses are 1+ on the right side, and 1+ on the left side         Posterior tibial pulses are 1+ on the right side  Feet:   Right Foot:   Skin Integrity: Positive for dry skin  Left Foot:   Skin Integrity: Positive for dry skin  Skin: Skin is dry  Capillary refill takes more than 3 seconds    Significant xerosis of skin noted bilateral   This is secondary to chronic moccasin tinea pedis   All nails are dystrophic left hallux is ingrown in the fibular aspect   Both hallux nails demonstrate mycosis   Vitals reviewed

## 2019-10-21 DIAGNOSIS — I10 BENIGN ESSENTIAL HYPERTENSION: ICD-10-CM

## 2019-10-21 RX ORDER — AMLODIPINE AND VALSARTAN 5; 160 MG/1; MG/1
1 TABLET ORAL DAILY
Qty: 90 TABLET | Refills: 1 | Status: SHIPPED | OUTPATIENT
Start: 2019-10-21 | End: 2020-01-08 | Stop reason: SDUPTHER

## 2019-11-04 ENCOUNTER — OFFICE VISIT (OUTPATIENT)
Dept: PODIATRY | Facility: CLINIC | Age: 84
End: 2019-11-04
Payer: COMMERCIAL

## 2019-11-04 VITALS
HEIGHT: 72 IN | HEART RATE: 68 BPM | RESPIRATION RATE: 17 BRPM | WEIGHT: 202 LBS | DIASTOLIC BLOOD PRESSURE: 77 MMHG | BODY MASS INDEX: 27.36 KG/M2 | SYSTOLIC BLOOD PRESSURE: 133 MMHG

## 2019-11-04 DIAGNOSIS — I70.209 PERIPHERAL ARTERIOSCLEROSIS (HCC): Primary | ICD-10-CM

## 2019-11-04 DIAGNOSIS — M79.671 PAIN IN BOTH FEET: ICD-10-CM

## 2019-11-04 DIAGNOSIS — B35.1 ONYCHOMYCOSIS: ICD-10-CM

## 2019-11-04 DIAGNOSIS — M79.672 PAIN IN BOTH FEET: ICD-10-CM

## 2019-11-04 PROCEDURE — 11721 DEBRIDE NAIL 6 OR MORE: CPT | Performed by: PODIATRIST

## 2019-11-04 NOTE — PROGRESS NOTES
Assessment/Plan:  Pain upon ambulation   Peripheral artery disease   Ingrown toenail left hallux   Mycosis of nail and skin      Plan   Foot exam performed   Patient family educated on condition   All nails debrided           Subjective:  Patient complains of pain in his left big toe   He is concerned about the thickening of the nail   He also has pain with shoe wear   No history of trauma     No past medical history on file      No past surgical history on file      No Known Allergies        Current Outpatient Medications:     amLODIPine-valsartan (EXFORGE) 5-160 MG per tablet, Take 1 tablet by mouth daily, Disp: 90 tablet, Rfl: 1    aspirin (ECOTRIN LOW STRENGTH) 81 mg EC tablet, Take 81 mg by mouth daily, Disp: , Rfl:     Bioflavonoid Products (KJ C PO), Take by mouth, Disp: , Rfl:     Calcium Carb-Cholecalciferol 600-1000 MG-UNIT CAPS, Take by mouth, Disp: , Rfl:     Cholecalciferol (VITAMIN D3) 400 units CAPS, Take by mouth daily  , Disp: , Rfl:     co-enzyme Q-10 100 mg capsule, Take 100 mg by mouth daily  , Disp: , Rfl:     DEXILANT 60 MG capsule, Take 60 mg by mouth daily, Disp: , Rfl:     Lutein 20 MG TABS, Take by mouth, Disp: , Rfl:     metoprolol succinate (TOPROL-XL) 25 mg 24 hr tablet, Take 1 tablet (25 mg total) by mouth daily, Disp: 90 tablet, Rfl: 1    multivitamin (THERAGRAN) TABS, Take 1 tablet by mouth daily, Disp: , Rfl:     omega-3-acid ethyl esters (LOVAZA) 1 g capsule, Take 2 g by mouth daily, Disp: , Rfl:     Probiotic Product (PROBIOTIC-10 PO), Take by mouth, Disp: , Rfl:     simvastatin (ZOCOR) 20 mg tablet, Take 1 tablet (20 mg total) by mouth daily, Disp: 90 tablet, Rfl: 1    SUPER B COMPLEX/C PO, Take by mouth, Disp: , Rfl:            Patient Active Problem List   Diagnosis    Basal cell carcinoma of skin of scalp and neck    Carotid stenosis, bilateral    Chronic fatigue syndrome    Chronic pain of left knee    Edema    Benign essential hypertension    Fatigue  Heart murmur    Hyperlipidemia    Inguinal hernia    Lumbosacral radiculopathy    Muscle weakness    Paroxysmal atrial fibrillation (HCC)    History of prostate cancer    Short-term memory loss    Sick sinus syndrome (HCC)    Traumatic hemorrhage of cerebrum without loss of consciousness (HCC)    Exertional dyspnea    GERD (gastroesophageal reflux disease)    Basal cell carcinoma (BCC) of skin of nose    Medicare annual wellness visit, initial    IFG (impaired fasting glucose)             Patient ID: Ronaldo Barcenas is a 80 y  o  male      HPI     The following portions of the patient's history were reviewed and updated as appropriate: He  has no past medical history on file  He           Patient Active Problem List     Diagnosis Date Noted    IFG (impaired fasting glucose) 12/28/2018    GERD (gastroesophageal reflux disease) 11/28/2018    Basal cell carcinoma (BCC) of skin of nose 11/28/2018    Medicare annual wellness visit, initial 11/28/2018    Exertional dyspnea 11/26/2018    Fatigue 11/25/2018    Paroxysmal atrial fibrillation ([facility] [address] ) 11/25/2018    Sick sinus syndrome ([facility] [address] ) 11/25/2018    Lumbosacral radiculopathy 04/09/2018    Traumatic hemorrhage of cerebrum without loss of consciousness ([facility] [address] ) 03/11/2018    Muscle weakness 02/27/2018    History of prostate cancer 10/12/2017    Chronic pain of left knee 06/20/2017    Benign essential hypertension 05/05/2016    Short-term memory loss 05/05/2016    Edema 03/31/2016    Carotid stenosis, bilateral 02/11/2016    Basal cell carcinoma of skin of scalp and neck 10/26/2015    Hyperlipidemia 10/15/2014    Chronic fatigue syndrome 09/15/2014    Heart murmur 03/26/2014    Inguinal hernia 06/28/2013      He  has no past surgical history on file  His family history includes No Known Problems in his father and mother  He  reports that he quit smoking about 69 years ago   He has never used smokeless tobacco  He reports that he does not drink alcohol   His drug history is not on file               Current Outpatient Medications   Medication Sig Dispense Refill    amLODIPine-valsartan (EXFORGE) 5-160 MG per tablet Take 1 tablet by mouth daily 90 tablet 1    aspirin (ECOTRIN LOW STRENGTH) 81 mg EC tablet Take 81 mg by mouth daily        Bioflavonoid Products (KJ C PO) Take by mouth        Calcium Carb-Cholecalciferol 600-1000 MG-UNIT CAPS Take by mouth        Cholecalciferol (VITAMIN D3) 400 units CAPS Take by mouth daily          co-enzyme Q-10 100 mg capsule Take 100 mg by mouth daily          DEXILANT 60 MG capsule Take 60 mg by mouth daily        Lutein 20 MG TABS Take by mouth        metoprolol succinate (TOPROL-XL) 25 mg 24 hr tablet Take 1 tablet (25 mg total) by mouth daily 90 tablet 1    multivitamin (THERAGRAN) TABS Take 1 tablet by mouth daily        omega-3-acid ethyl esters (LOVAZA) 1 g capsule Take 2 g by mouth daily        Probiotic Product (PROBIOTIC-10 PO) Take by mouth        simvastatin (ZOCOR) 20 mg tablet Take 1 tablet (20 mg total) by mouth daily 90 tablet 1    SUPER B COMPLEX/C PO Take by mouth          No current facility-administered medications for this visit                Current Outpatient Medications on File Prior to Visit   Medication Sig    amLODIPine-valsartan (EXFORGE) 5-160 MG per tablet Take 1 tablet by mouth daily    aspirin (ECOTRIN LOW STRENGTH) 81 mg EC tablet Take 81 mg by mouth daily    Bioflavonoid Products (KJ C PO) Take by mouth    Calcium Carb-Cholecalciferol 600-1000 MG-UNIT CAPS Take by mouth    Cholecalciferol (VITAMIN D3) 400 units CAPS Take by mouth daily      co-enzyme Q-10 100 mg capsule Take 100 mg by mouth daily      DEXILANT 60 MG capsule Take 60 mg by mouth daily    Lutein 20 MG TABS Take by mouth    metoprolol succinate (TOPROL-XL) 25 mg 24 hr tablet Take 1 tablet (25 mg total) by mouth daily    multivitamin (THERAGRAN) TABS Take 1 tablet by mouth daily    omega-3-acid ethyl esters (LOVAZA) 1 g capsule Take 2 g by mouth daily    Probiotic Product (PROBIOTIC-10 PO) Take by mouth    simvastatin (ZOCOR) 20 mg tablet Take 1 tablet (20 mg total) by mouth daily    SUPER B COMPLEX/C PO Take by mouth      No current facility-administered medications on file prior to visit        He has No Known Allergies              Vitals:     02/25/19 1310   BP: 166/71         Review of Systems       Objective:  Patient's shoes and socks removed    Foot Exam     General  General Appearance: appears stated age and healthy   Orientation: alert and oriented to person, place, and time   Affect: appropriate   Gait: antalgic         Right Foot/Ankle      Inspection and Palpation  Ecchymosis: none  Swelling: dorsum and metatarsals   Arch: pes planus  Hammertoes: fifth toe  Hallux valgus: no  Hallux limitus: yes  Skin Exam: dry skin and tinea;      Neurovascular  Dorsalis pedis: 1+  Posterior tibial: 1+  Superficial peroneal nerve sensation: diminished  Deep peroneal nerve sensation: diminished        Left Foot/Ankle       Inspection and Palpation  Ecchymosis: none  Swelling: dorsum and metatarsals   Arch: pes planus  Hammertoes: fifth toe  Hallux valgus: no  Hallux limitus: yes  Skin Exam: dry skin and tinea;      Neurovascular  Dorsalis pedis: 1+  Superficial peroneal nerve sensation: diminished  Deep peroneal nerve sensation: diminished           Physical Exam   Constitutional: He appears well-developed and well-nourished  Cardiovascular: Normal rate and regular rhythm  Pulses:       Dorsalis pedis pulses are 1+ on the right side, and 1+ on the left side         Posterior tibial pulses are 1+ on the right side  Feet:   Right Foot:   Skin Integrity: Positive for dry skin  Left Foot:   Skin Integrity: Positive for dry skin  Skin: Skin is dry  Capillary refill takes more than 3 seconds    Significant xerosis of skin noted bilateral   This is secondary to chronic moccasin tinea pedis   All nails are dystrophic left hallux is ingrown in the fibular aspect   Both hallux nails demonstrate mycosis   Vitals reviewed

## 2019-11-19 ENCOUNTER — TELEPHONE (OUTPATIENT)
Dept: CARDIOLOGY CLINIC | Facility: CLINIC | Age: 84
End: 2019-11-19

## 2019-11-19 DIAGNOSIS — R94.31 NONSPECIFIC ABNORMAL ELECTROCARDIOGRAM (ECG) (EKG): ICD-10-CM

## 2019-11-19 DIAGNOSIS — R07.9 CHEST PAIN, UNSPECIFIED TYPE: Primary | ICD-10-CM

## 2019-11-19 DIAGNOSIS — I48.11 LONGSTANDING PERSISTENT ATRIAL FIBRILLATION (HCC): ICD-10-CM

## 2019-11-20 DIAGNOSIS — I65.23 CAROTID STENOSIS, BILATERAL: ICD-10-CM

## 2019-11-20 DIAGNOSIS — E78.5 HYPERLIPIDEMIA, UNSPECIFIED HYPERLIPIDEMIA TYPE: ICD-10-CM

## 2019-11-20 RX ORDER — SIMVASTATIN 20 MG
TABLET ORAL
Qty: 90 TABLET | Refills: 1 | Status: SHIPPED | OUTPATIENT
Start: 2019-11-20 | End: 2020-01-08 | Stop reason: SDUPTHER

## 2019-12-27 ENCOUNTER — OFFICE VISIT (OUTPATIENT)
Dept: CARDIOLOGY CLINIC | Facility: CLINIC | Age: 84
End: 2019-12-27
Payer: COMMERCIAL

## 2019-12-27 VITALS
OXYGEN SATURATION: 95 % | BODY MASS INDEX: 27.09 KG/M2 | SYSTOLIC BLOOD PRESSURE: 116 MMHG | HEIGHT: 72 IN | WEIGHT: 200 LBS | HEART RATE: 71 BPM | DIASTOLIC BLOOD PRESSURE: 74 MMHG

## 2019-12-27 DIAGNOSIS — R07.9 CHEST PAIN, UNSPECIFIED TYPE: ICD-10-CM

## 2019-12-27 DIAGNOSIS — I49.5 SICK SINUS SYNDROME (HCC): ICD-10-CM

## 2019-12-27 DIAGNOSIS — I48.11 LONGSTANDING PERSISTENT ATRIAL FIBRILLATION (HCC): ICD-10-CM

## 2019-12-27 DIAGNOSIS — I65.23 CAROTID STENOSIS, BILATERAL: ICD-10-CM

## 2019-12-27 DIAGNOSIS — R06.00 EXERTIONAL DYSPNEA: ICD-10-CM

## 2019-12-27 DIAGNOSIS — I48.0 PAROXYSMAL ATRIAL FIBRILLATION (HCC): Primary | ICD-10-CM

## 2019-12-27 DIAGNOSIS — R29.6 FREQUENT FALLS: ICD-10-CM

## 2019-12-27 PROCEDURE — 93000 ELECTROCARDIOGRAM COMPLETE: CPT | Performed by: INTERNAL MEDICINE

## 2019-12-27 PROCEDURE — 99214 OFFICE O/P EST MOD 30 MIN: CPT | Performed by: INTERNAL MEDICINE

## 2019-12-27 NOTE — PROGRESS NOTES
Cardiology Outpatient Follow-up                                                          Denise Fong  [id number]  1934        1  Paroxysmal atrial fibrillation (HCC)  POCT ECG   2  Chest pain, unspecified type  POCT ECG   3  Longstanding persistent atrial fibrillation  POCT ECG   4  Sick sinus syndrome (HCC)  POCT ECG   5  Carotid stenosis, bilateral  POCT ECG   6  Exertional dyspnea  POCT ECG   7  Frequent falls  POCT ECG       Discussion/Summary:  Exertional Dyspnea/abnormal ekg- Negative stress test Bifascicular heart block likely degenerative  Murmur/Dyspnea-normal EF  Aortic sclerosis without stenosis    Paroxysmal afib- continue aspirin 81mg every day with food- We had a mutual discussion  Currently his risk of fall is greater than his risk of  Thromboembolism  The patient family would like to continue on aspirin therapy  + simvastatin 20mg  Taken off eliquis due to falls  GERD-   Continue PPI therapy    Carotid Artery U/S- aspirin  + simvastatin    Unstable gait/shuffling- balance center assessment/physical activity? Follow-up with primary pending    HPI:  29-year-old gentleman with history of bifascicular heart block, paroxysmal atrial fibrillation previously on anticoagulation currently on aspirin, mild carotid artery disease presents for initial visitation  Reports having some exertional shortness of breath  No prior history of myocardial infarction or stent placement  States having some mild lower extremity edema  Reports a sedentary lifestyle after a fall  He reports his last fall was mechanical   Reports having unstable gait       12/27:  Reviewed recent testing  Has some PUD/discomfort  Still with unstable gait  Has not seen the balance center  12/27/2019:   He is compliant with his antihypertensive medication  He is taking aspirin 81 mg daily  There has been a fall 2 months ago  Was a mechanical fall  No head trauma    He is compliant with his medications  There has been no recent GI bleeding  PMH  Paroxysmal atrial fibrillation  Sick Sinus Syndrome  Mixed Hld  Htn  Carotid Stenosis- aspirin      Social Hx  Wife  Son [place]   Retired  Former smoker quit 1965      History reviewed  No pertinent past medical history  Social History     Socioeconomic History    Marital status: /Civil Union     Spouse name: Not on file    Number of children: Not on file    Years of education: Not on file    Highest education level: Not on file   Occupational History    Not on file   Social Needs    Financial resource strain: Not on file    Food insecurity:     Worry: Not on file     Inability: Not on file    Transportation needs:     Medical: Not on file     Non-medical: Not on file   Tobacco Use    Smoking status: Former Smoker     Last attempt to quit: 1970     Years since quittin 0    Smokeless tobacco: Never Used   Substance and Sexual Activity    Alcohol use: No    Drug use: Not on file    Sexual activity: Not on file   Lifestyle    Physical activity:     Days per week: Not on file     Minutes per session: Not on file    Stress: Not on file   Relationships    Social connections:     Talks on phone: Not on file     Gets together: Not on file     Attends [employer] service: Not on file     Active member of club or organization: Not on file     Attends meetings of clubs or organizations: Not on file     Relationship status: Not on file    Intimate partner violence:     Fear of current or ex partner: Not on file     Emotionally abused: Not on file     Physically abused: Not on file     Forced sexual activity: Not on file   Other Topics Concern    Not on file   Social History Narrative    Not on file      Family History   Problem Relation Age of Onset    No Known Problems Mother     No Known Problems Father      History reviewed  No pertinent surgical history      Current Outpatient Medications:    amLODIPine-valsartan (EXFORGE) 5-160 MG per tablet, TAKE 1 TABLET BY MOUTH  DAILY, Disp: 90 tablet, Rfl: 1    aspirin (ECOTRIN LOW STRENGTH) 81 mg EC tablet, Take 81 mg by mouth daily, Disp: , Rfl:     Bioflavonoid Products (KJ C PO), Take by mouth, Disp: , Rfl:     Calcium Carb-Cholecalciferol 600-1000 MG-UNIT CAPS, Take by mouth, Disp: , Rfl:     Cholecalciferol (VITAMIN D3) 400 units CAPS, Take by mouth daily  , Disp: , Rfl:     co-enzyme Q-10 100 mg capsule, Take 100 mg by mouth daily  , Disp: , Rfl:     DEXILANT 60 MG capsule, Take 60 mg by mouth daily, Disp: , Rfl:     Lutein 20 MG TABS, Take by mouth, Disp: , Rfl:     metoprolol succinate (TOPROL-XL) 25 mg 24 hr tablet, TAKE 1 TABLET BY MOUTH  DAILY, Disp: 90 tablet, Rfl: 1    multivitamin (THERAGRAN) TABS, Take 1 tablet by mouth daily, Disp: , Rfl:     Omega-3 Fatty Acids (FISH OIL OMEGA-3 PO), Take 2 g by mouth daily, Disp: , Rfl:     omega-3-acid ethyl esters (LOVAZA) 1 g capsule, Take 2 g by mouth daily, Disp: , Rfl:     Probiotic Product (PROBIOTIC-10 PO), Take by mouth, Disp: , Rfl:     simvastatin (ZOCOR) 20 mg tablet, TAKE 1 TABLET BY MOUTH  DAILY, Disp: 90 tablet, Rfl: 1    SUPER B COMPLEX/C PO, Take by mouth, Disp: , Rfl:     ketoconazole (NIZORAL) 2 % cream, Apply topically daily for 30 days (Patient not taking: Reported on 7/8/2019), Disp: 60 g, Rfl: 1    memantine (NAMENDA) 10 mg tablet, Take 10 mg by mouth, Disp: , Rfl:   No Known Allergies  Vitals:    12/27/19 1050   BP: 116/74   BP Location: Right arm   Patient Position: Supine   Cuff Size: Standard   Pulse: 71   SpO2: 95%   Weight: 90 7 kg (200 lb)   Height: 5' 11 5" (1 816 m)       Review of Systems:  Review of Systems   Constitutional: Negative  HENT: Negative  Eyes: Negative  Respiratory: Positive for shortness of breath  Cardiovascular: Negative for chest pain  Gastrointestinal: Negative  Endocrine: Negative  Genitourinary: Negative  Musculoskeletal: Negative  Skin: Negative  Allergic/Immunologic: Negative  Neurological: Negative  Hematological: Negative  Psychiatric/Behavioral: Negative  Vitals:    12/27/19 1050   BP: 116/74   BP Location: Right arm   Patient Position: Supine   Cuff Size: Standard   Pulse: 71   SpO2: 95%   Weight: 90 7 kg (200 lb)   Height: 5' 11 5" (1 816 m)     Physical Exam:  Physical Exam   Constitutional: He is oriented to person, place, and time  He appears well-developed and well-nourished  No distress  HENT:   Head: Normocephalic and atraumatic  Right Ear: External ear normal    Left Ear: External ear normal    Eyes: Pupils are equal, round, and reactive to light  Conjunctivae are normal  Right eye exhibits no discharge  Left eye exhibits no discharge  No scleral icterus  Neck: Normal range of motion  Neck supple  No JVD present  No tracheal deviation present  No thyromegaly present  Cardiovascular: Normal rate and regular rhythm  Exam reveals gallop  Exam reveals no friction rub  Murmur heard  Pulmonary/Chest: Effort normal and breath sounds normal  No stridor  No respiratory distress  He has no wheezes  He has no rales  He exhibits no tenderness  Abdominal: Soft  Bowel sounds are normal  He exhibits no distension and no mass  There is no tenderness  There is no rebound and no guarding  Musculoskeletal: Normal range of motion  He exhibits no edema, tenderness or deformity  Neurological: He is alert and oriented to person, place, and time  He has normal reflexes  No cranial nerve deficit  He exhibits normal muscle tone  Coordination normal    Skin: Skin is warm and dry  No rash noted  He is not diaphoretic  No erythema  No pallor  Psychiatric: He has a normal mood and affect  His behavior is normal  Judgment and thought content normal    Nursing note and vitals reviewed  Labs:   Pending    Imaging & Testing   I have personally reviewed pertinent reports        EKG: Personally reviewed  Normal sinus rhythm  Right bundle branch left anterior fascicular block  Cannot rule out septal infract    Cardiac testing:   No results found for this or any previous visit  Haylee Villasenor MD [facility] [phone]  Please call with any questions or suggestions    Counseling :  A description of the counseling:   Goals and Barriers:  Patient's ability to self care:  Medication side effect reviewed with patient in detail and all their questions answered  "This note has been constructed using a voice recognition system  Therefore there may be syntax, spelling, and/or grammatical errors   Please call if you have any questions  "

## 2020-01-04 DIAGNOSIS — I10 BENIGN ESSENTIAL HYPERTENSION: ICD-10-CM

## 2020-01-05 RX ORDER — METOPROLOL SUCCINATE 25 MG/1
TABLET, EXTENDED RELEASE ORAL
Qty: 90 TABLET | Refills: 0 | Status: SHIPPED | OUTPATIENT
Start: 2020-01-05 | End: 2020-01-08 | Stop reason: SDUPTHER

## 2020-01-08 ENCOUNTER — OFFICE VISIT (OUTPATIENT)
Dept: FAMILY MEDICINE CLINIC | Facility: CLINIC | Age: 85
End: 2020-01-08
Payer: COMMERCIAL

## 2020-01-08 VITALS
HEIGHT: 72 IN | BODY MASS INDEX: 27.09 KG/M2 | SYSTOLIC BLOOD PRESSURE: 110 MMHG | OXYGEN SATURATION: 97 % | WEIGHT: 200 LBS | HEART RATE: 65 BPM | DIASTOLIC BLOOD PRESSURE: 60 MMHG | TEMPERATURE: 97.8 F | RESPIRATION RATE: 16 BRPM

## 2020-01-08 DIAGNOSIS — S06.360S TRAUMATIC HEMORRHAGE OF CEREBRUM WITHOUT LOSS OF CONSCIOUSNESS, UNSPECIFIED LATERALITY, SEQUELA (HCC): ICD-10-CM

## 2020-01-08 DIAGNOSIS — R73.01 IFG (IMPAIRED FASTING GLUCOSE): ICD-10-CM

## 2020-01-08 DIAGNOSIS — E78.5 HYPERLIPIDEMIA, UNSPECIFIED HYPERLIPIDEMIA TYPE: ICD-10-CM

## 2020-01-08 DIAGNOSIS — Z12.5 PROSTATE CANCER SCREENING: ICD-10-CM

## 2020-01-08 DIAGNOSIS — I48.0 PAROXYSMAL ATRIAL FIBRILLATION (HCC): ICD-10-CM

## 2020-01-08 DIAGNOSIS — Z85.46 HISTORY OF PROSTATE CANCER: ICD-10-CM

## 2020-01-08 DIAGNOSIS — I10 BENIGN ESSENTIAL HYPERTENSION: ICD-10-CM

## 2020-01-08 DIAGNOSIS — Z00.00 MEDICARE ANNUAL WELLNESS VISIT, SUBSEQUENT: Primary | ICD-10-CM

## 2020-01-08 DIAGNOSIS — I65.23 CAROTID STENOSIS, BILATERAL: ICD-10-CM

## 2020-01-08 PROCEDURE — 3725F SCREEN DEPRESSION PERFORMED: CPT | Performed by: FAMILY MEDICINE

## 2020-01-08 PROCEDURE — 1170F FXNL STATUS ASSESSED: CPT | Performed by: FAMILY MEDICINE

## 2020-01-08 PROCEDURE — 1160F RVW MEDS BY RX/DR IN RCRD: CPT | Performed by: FAMILY MEDICINE

## 2020-01-08 PROCEDURE — 1125F AMNT PAIN NOTED PAIN PRSNT: CPT | Performed by: FAMILY MEDICINE

## 2020-01-08 PROCEDURE — 99214 OFFICE O/P EST MOD 30 MIN: CPT | Performed by: FAMILY MEDICINE

## 2020-01-08 PROCEDURE — G0439 PPPS, SUBSEQ VISIT: HCPCS | Performed by: FAMILY MEDICINE

## 2020-01-08 RX ORDER — SIMVASTATIN 20 MG
20 TABLET ORAL DAILY
Qty: 90 TABLET | Refills: 1 | Status: SHIPPED | OUTPATIENT
Start: 2020-01-08 | End: 2020-07-10

## 2020-01-08 RX ORDER — METOPROLOL SUCCINATE 25 MG/1
25 TABLET, EXTENDED RELEASE ORAL DAILY
Qty: 90 TABLET | Refills: 1 | Status: SHIPPED | OUTPATIENT
Start: 2020-01-08 | End: 2020-03-30

## 2020-01-08 RX ORDER — AMLODIPINE AND VALSARTAN 5; 160 MG/1; MG/1
1 TABLET ORAL DAILY
Qty: 90 TABLET | Refills: 1 | Status: SHIPPED | OUTPATIENT
Start: 2020-01-08 | End: 2020-07-07

## 2020-01-08 NOTE — PATIENT INSTRUCTIONS
A Medicare Annual Wellness Visit and questionnaire was performed today  The visit included a review of your health habits, risk factors and age/risk appropriate screening tests/immunizations that you may be due for  This visit is not a substitute for follow-up visits for chronic medical conditions or evaluation of acute problems  Since the Annual Wellness health review is covered only once every 365 days, your next one should not occur before then  Medicare Preventive Visit Patient Instructions  Thank you for completing your Welcome to Medicare Visit or Medicare Annual Wellness Visit today  Your next wellness visit will be due in one year (1/8/2021)  The screening/preventive services that you may require over the next 5-10 years are detailed below  Some tests may not apply to you based off risk factors and/or age  Screening tests ordered at today's visit but not completed yet may show as past due  Also, please note that scanned in results may not display below  Preventive Screenings:  Service Recommendations Previous Testing/Comments   Colorectal Cancer Screening  · Colonoscopy    · Fecal Occult Blood Test (FOBT)/Fecal Immunochemical Test (FIT)  · Fecal DNA/Cologuard Test  · Flexible Sigmoidoscopy Age: 54-65 years old   Colonoscopy: every 10 years (May be performed more frequently if at higher risk)  OR  FOBT/FIT: every 1 year  OR  Cologuard: every 3 years  OR  Sigmoidoscopy: every 5 years  Screening may be recommended earlier than age 48 if at higher risk for colorectal cancer  Also, an individualized decision between you and your healthcare provider will decide whether screening between the ages of 74-80 would be appropriate   Colonoscopy: Not on file  FOBT/FIT: Not on file  Cologuard: Not on file  Sigmoidoscopy: Not on file    Screening Not Indicated     Prostate Cancer Screening Individualized decision between patient and health care provider in men between ages of 53-78   Medicare will cover every 12 months beginning on the day after your 50th birthday PSA: <0 1 ng/mL     History Prostate Cancer  Screening Not Indicated     Hepatitis C Screening Once for adults born between 1945 and 1965  More frequently in patients at high risk for Hepatitis C Hep C Antibody: Not on file    Patient Declines   Diabetes Screening 1-2 times per year if you're at risk for diabetes or have pre-diabetes Fasting glucose: No results in last 5 years   A1C: No results in last 5 years    Screening Current   Cholesterol Screening Once every 5 years if you don't have a lipid disorder  May order more often based on risk factors  Lipid panel: Not on file    Screening Not Indicated  History Lipid Disorder      Other Preventive Screenings Covered by Medicare:  1  Abdominal Aortic Aneurysm (AAA) Screening: covered once if your at risk  You're considered to be at risk if you have a family history of AAA or a male between the age of 73-68 who smoking at least 100 cigarettes in your lifetime  2  Lung Cancer Screening: covers low dose CT scan once per year if you meet all of the following conditions: (1) Age 50-69; (2) No signs or symptoms of lung cancer; (3) Current smoker or have quit smoking within the last 15 years; (4) You have a tobacco smoking history of at least 30 pack years (packs per day x number of years you smoked); (5) You get a written order from a healthcare provider  3  Glaucoma Screening: covered annually if you're considered high risk: (1) You have diabetes OR (2) Family history of glaucoma OR (3)  aged 48 and older OR (3)  American aged 72 and older  3  Osteoporosis Screening: covered every 2 years if you meet one of the following conditions: (1) Have a vertebral abnormality; (2) On glucocorticoid therapy for more than 3 months; (3) Have primary hyperparathyroidism; (4) On osteoporosis medications and need to assess response to drug therapy    5  HIV Screening: covered annually if you're between the age of 15-65  Also covered annually if you are younger than 13 and older than 72 with risk factors for HIV infection  For pregnant patients, it is covered up to 3 times per pregnancy  Immunizations:  Immunization Recommendations   Influenza Vaccine Annual influenza vaccination during flu season is recommended for all persons aged >= 6 months who do not have contraindications   Pneumococcal Vaccine (Prevnar and Pneumovax)  * Prevnar = PCV13  * Pneumovax = PPSV23 Adults 25-60 years old: 1-3 doses may be recommended based on certain risk factors  Adults 72 years old: Prevnar (PCV13) vaccine recommended followed by Pneumovax (PPSV23) vaccine  If already received PPSV23 since turning 65, then PCV13 recommended at least one year after PPSV23 dose  Hepatitis B Vaccine 3 dose series if at intermediate or high risk (ex: diabetes, end stage renal disease, liver disease)   Tetanus (Td) Vaccine - COST NOT COVERED BY MEDICARE PART B Following completion of primary series, a booster dose should be given every 10 years to maintain immunity against tetanus  Td may also be given as tetanus wound prophylaxis  Tdap Vaccine - COST NOT COVERED BY MEDICARE PART B Recommended at least once for all adults  For pregnant patients, recommended with each pregnancy  Shingles Vaccine (Shingrix) - COST NOT COVERED BY MEDICARE PART B  2 shot series recommended in those aged 48 and above     Health Maintenance Due:  There are no preventive care reminders to display for this patient  Immunizations Due:  There are no preventive care reminders to display for this patient  Advance Directives   What are advance directives? Advance directives are legal documents that state your wishes and plans for medical care  These plans are made ahead of time in case you lose your ability to make decisions for yourself  Advance directives can apply to any medical decision, such as the treatments you want, and if you want to donate organs     What are the types of advance directives? There are many types of advance directives, and each state has rules about how to use them  You may choose a combination of any of the following:  · Living will: This is a written record of the treatment you want  You can also choose which treatments you do not want, which to limit, and which to stop at a certain time  This includes surgery, medicine, IV fluid, and tube feedings  · Durable power of  for healthcare [facility]): This is a written record that states who you want to make healthcare choices for you when you are unable to make them for yourself  This person, called a proxy, is usually a family member or a friend  You may choose more than 1 proxy  · Do not resuscitate (DNR) order:  A DNR order is used in case your heart stops beating or you stop breathing  It is a request not to have certain forms of treatment, such as CPR  A DNR order may be included in other types of advance directives  · Medical directive: This covers the care that you want if you are in a coma, near death, or unable to make decisions for yourself  You can list the treatments you want for each condition  Treatment may include pain medicine, surgery, blood transfusions, dialysis, IV or tube feedings, and a ventilator (breathing machine)  · Values history: This document has questions about your views, beliefs, and how you feel and think about life  This information can help others choose the care that you would choose  Why are advance directives important? An advance directive helps you control your care  Although spoken wishes may be used, it is better to have your wishes written down  Spoken wishes can be misunderstood, or not followed  Treatments may be given even if you do not want them  An advance directive may make it easier for your family to make difficult choices about your care  Fall Prevention    Fall prevention  includes ways to make your home and other areas safer   It also includes ways you can move more carefully to prevent a fall  Health conditions that cause changes in your blood pressure, vision, or muscle strength and coordination may increase your risk for falls  Medicines may also increase your risk for falls if they make you dizzy, weak, or sleepy  Fall prevention tips:   · Stand or sit up slowly  · Use assistive devices as directed  · Wear shoes that fit well and have soles that   · Wear a personal alarm  · Stay active  · Manage your medical conditions  Home Safety Tips:  · Add items to prevent falls in the bathroom  · Keep paths clear  · Install bright lights in your home  · Keep items you use often on shelves within reach  · Paint or place reflective tape on the edges of your stairs  Weight Management   Why it is important to manage your weight:  Being overweight increases your risk of health conditions such as heart disease, high blood pressure, type 2 diabetes, and certain types of cancer  It can also increase your risk for osteoarthritis, sleep apnea, and other respiratory problems  Aim for a slow, steady weight loss  Even a small amount of weight loss can lower your risk of health problems  How to lose weight safely:  A safe and healthy way to lose weight is to eat fewer calories and get regular exercise  You can lose up about 1 pound a week by decreasing the number of calories you eat by 500 calories each day  Healthy meal plan for weight management:  A healthy meal plan includes a variety of foods, contains fewer calories, and helps you stay healthy  A healthy meal plan includes the following:  · Eat whole-grain foods more often  A healthy meal plan should contain fiber  Fiber is the part of grains, fruits, and vegetables that is not broken down by your body  Whole-grain foods are healthy and provide extra fiber in your diet   Some examples of whole-grain foods are whole-wheat breads and pastas, oatmeal, brown rice, and bulgur  · Eat a variety of vegetables every day  Include dark, leafy greens such as spinach, kale, jenae greens, and mustard greens  Eat yellow and orange vegetables such as carrots, sweet potatoes, and winter squash  · Eat a variety of fruits every day  Choose fresh or canned fruit (canned in its own juice or light syrup) instead of juice  Fruit juice has very little or no fiber  · Eat low-fat dairy foods  Drink fat-free (skim) milk or 1% milk  Eat fat-free yogurt and low-fat cottage cheese  Try low-fat cheeses such as mozzarella and other reduced-fat cheeses  · Choose meat and other protein foods that are low in fat  Choose beans or other legumes such as split peas or lentils  Choose fish, skinless poultry (chicken or turkey), or lean cuts of red meat (beef or pork)  Before you cook meat or poultry, cut off any visible fat  · Use less fat and oil  Try baking foods instead of frying them  Add less fat, such as margarine, sour cream, regular salad dressing and mayonnaise to foods  Eat fewer high-fat foods  Some examples of high-fat foods include french fries, doughnuts, ice cream, and cakes  · Eat fewer sweets  Limit foods and drinks that are high in sugar  This includes candy, cookies, regular soda, and sweetened drinks  Exercise:  Exercise at least 30 minutes per day on most days of the week  Some examples of exercise include walking, biking, dancing, and swimming  You can also fit in more physical activity by taking the stairs instead of the elevator or parking farther away from stores  Ask your healthcare provider about the best exercise plan for you  © Copyright [employer] 2018 Information is for End User's use only and may not be sold, redistributed or otherwise used for commercial purposes   All illustrations and images included in CareNotes® are the copyrighted property of [employer]  or [address] [place] [address] [employer]

## 2020-01-08 NOTE — PROGRESS NOTES
Assessment/Plan:    No problem-specific Assessment & Plan notes found for this encounter  Htn/chol stable  ifg advised/aware  PAF stable  q6m f/u    BMI Counseling: Body mass index is 27 51 kg/m²  The BMI is above normal  Nutrition recommendations include moderation in carbohydrate intake  No pharmacotherapy was ordered  Diagnoses and all orders for this visit:    Benign essential hypertension  -     metoprolol succinate (TOPROL-XL) 25 mg 24 hr tablet; Take 1 tablet (25 mg total) by mouth daily  -     amLODIPine-valsartan (EXFORGE) 5-160 MG per tablet; Take 1 tablet by mouth daily    Paroxysmal atrial fibrillation (HCC)    Traumatic hemorrhage of cerebrum without loss of consciousness, unspecified laterality, sequela (HCC)    Hyperlipidemia, unspecified hyperlipidemia type  -     simvastatin (ZOCOR) 20 mg tablet; Take 1 tablet (20 mg total) by mouth daily    Prostate cancer screening  -     PSA, Total Screen; Future    IFG (impaired fasting glucose)  -     Comprehensive metabolic panel; Future  -     Hemoglobin A1C; Future    History of prostate cancer  Comments:  with radioactive seeds    Carotid stenosis, bilateral  -     simvastatin (ZOCOR) 20 mg tablet; Take 1 tablet (20 mg total) by mouth daily    Medicare annual wellness visit, subsequent    Other orders  -     Cancel: DEXILANT 60 MG capsule; Take 1 capsule (60 mg total) by mouth daily              Return in about 6 months (around 7/8/2020) for Recheck  Subjective:      Patient ID: Sharan Joshi is a 80 y o  male      Chief Complaint   Patient presents with    Follow-up     AWV jlopezcma        HPI  Taking all meds  On asa for PAF  Dexilant from Dr Sonja Maxwell  I give bp/chol meds  Some balance issues  On asa  Fall risks aware  Kathryn Spears 912 warning aware, still on exforge and no probs obtaining at this time  Use of statins for the purposes of CVD/CVA risks reduction was advised according to USPSTF guidelines along with appropriate continued liver monitoring  Recent data suggesting increased risk of ischemic CVA and chronic kidney damage with PPI use was advised  The following portions of the patient's history were reviewed and updated as appropriate: allergies, current medications, past family history, past medical history, past social history, past surgical history and problem list     Review of Systems   Respiratory: Negative for shortness of breath  Cardiovascular: Negative for chest pain  Current Outpatient Medications   Medication Sig Dispense Refill    amLODIPine-valsartan (EXFORGE) 5-160 MG per tablet Take 1 tablet by mouth daily 90 tablet 1    aspirin (ECOTRIN LOW STRENGTH) 81 mg EC tablet Take 81 mg by mouth daily      Bioflavonoid Products (KJ C PO) Take by mouth      Calcium Carb-Cholecalciferol 600-1000 MG-UNIT CAPS Take by mouth      Cholecalciferol (VITAMIN D3) 400 units CAPS Take by mouth daily        co-enzyme Q-10 100 mg capsule Take 100 mg by mouth daily        DEXILANT 60 MG capsule Take 60 mg by mouth daily      Lutein 20 MG TABS Take by mouth      metoprolol succinate (TOPROL-XL) 25 mg 24 hr tablet Take 1 tablet (25 mg total) by mouth daily 90 tablet 1    multivitamin (THERAGRAN) TABS Take 1 tablet by mouth daily      Omega-3 Fatty Acids (FISH OIL OMEGA-3 PO) Take 2 g by mouth daily      Probiotic Product (PROBIOTIC-10 PO) Take by mouth      simvastatin (ZOCOR) 20 mg tablet Take 1 tablet (20 mg total) by mouth daily 90 tablet 1    SUPER B COMPLEX/C PO Take by mouth       No current facility-administered medications for this visit  Objective:    /60   Pulse 65   Temp 97 8 °F (36 6 °C)   Resp 16   Ht 5' 11 5" (1 816 m)   Wt 90 7 kg (200 lb)   SpO2 97%   BMI 27 51 kg/m²        Physical Exam   Constitutional: He appears well-developed  No distress  HENT:   Head: Normocephalic     Right Ear: External ear normal    Left Ear: External ear normal    Mouth/Throat: Oropharynx is clear and moist  No oropharyngeal exudate  Eyes: Conjunctivae are normal  No scleral icterus  Neck: Neck supple  No thyromegaly present  Cardiovascular: Normal rate, regular rhythm, normal heart sounds and intact distal pulses  No murmur heard  Pulmonary/Chest: Effort normal  No respiratory distress  He has no wheezes  He has no rales  Abdominal: Soft  Bowel sounds are normal  He exhibits no mass  There is no tenderness  There is no guarding  Musculoskeletal: He exhibits no edema or deformity  Neurological: He is alert  Skin: Skin is warm and dry  No pallor  Psychiatric: His behavior is normal  Thought content normal    Nursing note and vitals reviewed               Stefano DO Case

## 2020-01-09 NOTE — PROGRESS NOTES
Assessment and Plan:     Problem List Items Addressed This Visit        Active Problems    Benign essential hypertension    Relevant Medications    metoprolol succinate (TOPROL-XL) 25 mg 24 hr tablet    amLODIPine-valsartan (EXFORGE) 5-160 MG per tablet    Carotid stenosis, bilateral    Relevant Medications    simvastatin (ZOCOR) 20 mg tablet    History of prostate cancer    Hyperlipidemia    Relevant Medications    simvastatin (ZOCOR) 20 mg tablet    IFG (impaired fasting glucose)    Relevant Orders    Comprehensive metabolic panel    Hemoglobin A1C    Medicare annual wellness visit, subsequent - Primary    Paroxysmal atrial fibrillation (HCC)    Relevant Medications    metoprolol succinate (TOPROL-XL) 25 mg 24 hr tablet    Prostate cancer screening    Relevant Orders    PSA, Total Screen    Traumatic hemorrhage of cerebrum without loss of consciousness (HCC)        BMI Counseling: Body mass index is 27 51 kg/m²  The BMI is above normal  Nutrition recommendations include moderation in carbohydrate intake  No pharmacotherapy was ordered  Preventive health issues were discussed with patient, and age appropriate screening tests were ordered as noted in patient's After Visit Summary  Personalized health advice and appropriate referrals for health education or preventive services given if needed, as noted in patient's After Visit Summary       History of Present Illness:     Patient presents for Medicare Annual Wellness visit    Patient Care Team:  Catherine Dasilva DO as PCP - General (Family Medicine)     Problem List:     Patient Active Problem List   Diagnosis    Basal cell carcinoma of skin of scalp and neck    Carotid stenosis, bilateral    Chronic fatigue syndrome    Chronic pain of left knee    Edema    Benign essential hypertension    Fatigue    Heart murmur    Hyperlipidemia    Inguinal hernia    Lumbosacral radiculopathy    Muscle weakness    Paroxysmal atrial fibrillation ([facility] [address] )    History of prostate cancer    Short-term memory loss    Sick sinus syndrome (HCC)    Traumatic hemorrhage of cerebrum without loss of consciousness (HCC)    Exertional dyspnea    GERD (gastroesophageal reflux disease)    Basal cell carcinoma (BCC) of skin of nose    Medicare annual wellness visit, initial    IFG (impaired fasting glucose)    Peripheral arteriosclerosis (HCC)    Pain in both feet    Onychomycosis    Prostate cancer screening    Medicare annual wellness visit, subsequent      Past Medical and Surgical History:     History reviewed  No pertinent past medical history  History reviewed  No pertinent surgical history     Family History:     Family History   Problem Relation Age of Onset    No Known Problems Mother     No Known Problems Father       Social History:     Social History     Socioeconomic History    Marital status: /Civil Union     Spouse name: None    Number of children: None    Years of education: None    Highest education level: None   Occupational History    None   Social Needs    Financial resource strain: None    Food insecurity:     Worry: None     Inability: None    Transportation needs:     Medical: None     Non-medical: None   Tobacco Use    Smoking status: Former Smoker     Last attempt to quit: 1970     Years since quittin 0    Smokeless tobacco: Never Used   Substance and Sexual Activity    Alcohol use: No    Drug use: None    Sexual activity: None   Lifestyle    Physical activity:     Days per week: None     Minutes per session: None    Stress: None   Relationships    Social connections:     Talks on phone: None     Gets together: None     Attends Latter day service: None     Active member of club or organization: None     Attends meetings of clubs or organizations: None     Relationship status: None    Intimate partner violence:     Fear of current or ex partner: None     Emotionally abused: None     Physically abused: None     Forced sexual activity: None   Other Topics Concern    None   Social History Narrative    None       Medications and Allergies:     Current Outpatient Medications   Medication Sig Dispense Refill    amLODIPine-valsartan (EXFORGE) 5-160 MG per tablet Take 1 tablet by mouth daily 90 tablet 1    aspirin (ECOTRIN LOW STRENGTH) 81 mg EC tablet Take 81 mg by mouth daily      Bioflavonoid Products (KJ C PO) Take by mouth      Calcium Carb-Cholecalciferol 600-1000 MG-UNIT CAPS Take by mouth      Cholecalciferol (VITAMIN D3) 400 units CAPS Take by mouth daily        co-enzyme Q-10 100 mg capsule Take 100 mg by mouth daily        DEXILANT 60 MG capsule Take 60 mg by mouth daily      Lutein 20 MG TABS Take by mouth      metoprolol succinate (TOPROL-XL) 25 mg 24 hr tablet Take 1 tablet (25 mg total) by mouth daily 90 tablet 1    multivitamin (THERAGRAN) TABS Take 1 tablet by mouth daily      Omega-3 Fatty Acids (FISH OIL OMEGA-3 PO) Take 2 g by mouth daily      Probiotic Product (PROBIOTIC-10 PO) Take by mouth      simvastatin (ZOCOR) 20 mg tablet Take 1 tablet (20 mg total) by mouth daily 90 tablet 1    SUPER B COMPLEX/C PO Take by mouth       No current facility-administered medications for this visit  No Known Allergies   Immunizations:     Immunization History   Administered Date(s) Administered    DTaP 03/05/2010    INFLUENZA 10/16/2006, 09/08/2010, 09/19/2011, 10/03/2012, 09/15/2013, 09/01/2017    Influenza Split High Dose Preservative Free IM 10/15/2014, 10/26/2015, 10/21/2016, 09/23/2019    Pneumococcal Conjugate 13-Valent 10/26/2015    Pneumococcal Polysaccharide PPV23 03/07/2011    Td (adult), adsorbed 03/05/2010    Zoster 11/14/2011    Zoster Vaccine Recombinant 08/10/2019, 11/21/2019      Health Maintenance: There are no preventive care reminders to display for this patient  There are no preventive care reminders to display for this patient     Medicare Health Risk Assessment:     BP 110/60   Pulse 65   Temp 97 8 °F (36 6 °C)   Resp 16   Ht 5' 11 5" (1 816 m)   Wt 90 7 kg (200 lb)   SpO2 97%   BMI 27 51 kg/m²      Grady Dobbins is here for his Subsequent Wellness visit  Last Medicare Wellness visit information reviewed, patient interviewed and updates made to the record today  Health Risk Assessment:   Patient rates overall health as good  Patient feels that their physical health rating is same  Eyesight was rated as same  Hearing was rated as same  Patient feels that their emotional and mental health rating is same  Pain experienced in the last 7 days has been none  Patient states that he has experienced no weight loss or gain in last 6 months  Depression Screening:   PHQ-2 Score: 1      Fall Risk Screening: In the past year, patient has experienced: history of falling in past year    Number of falls: 2 or more  Injured during fall?: No    Feels unsteady when standing or walking?: Yes    Worried about falling?: Yes      Home Safety:  Patient has trouble with stairs inside or outside of their home  Patient has working smoke alarms and has working carbon monoxide detector  Home safety hazards include: none  Nutrition:   Current diet is Regular and Limited junk food  Medications:   Patient is not currently taking any over-the-counter supplements  Patient is able to manage medications  Activities of Daily Living (ADLs)/Instrumental Activities of Daily Living (IADLs):   Walk and transfer into and out of bed and chair?: Yes  Dress and groom yourself?: Yes    Bathe or shower yourself?: Yes    Feed yourself? Yes  Do your laundry/housekeeping?: Yes  Manage your money, pay your bills and track your expenses?: No  Make your own meals?: No    Do your own shopping?: No    Previous Hospitalizations:   Any hospitalizations or ED visits within the last 12 months?: No      Advance Care Planning:   Living will: Yes    Durable POA for healthcare:  Yes    Advanced directive: Yes    End of Life Decisions reviewed with patient: No      Cognitive Screening:   Provider or family/friend/caregiver concerned regarding cognition?: No    PREVENTIVE SCREENINGS      Cardiovascular Screening:    General: Screening Not Indicated and History Lipid Disorder      Diabetes Screening:     General: Screening Current      Colorectal Cancer Screening:     General: Screening Not Indicated      Prostate Cancer Screening:    General: History Prostate Cancer and Screening Not Indicated      Osteoporosis Screening:    General: Screening Not Indicated      Abdominal Aortic Aneurysm (AAA) Screening:    Risk factors include: tobacco use        General: Screening Not Indicated      Lung Cancer Screening:     General: Screening Not Indicated      Hepatitis C Screening:    General: Patient Declines    Hep C Screening Accepted: No     Other Counseling Topics:   Regular weightbearing exercise         Ty Izaguirre DO

## 2020-01-10 LAB
ALBUMIN SERPL-MCNC: 4.5 G/DL (ref 3.5–4.7)
ALBUMIN/GLOB SERPL: 1.9 {RATIO} (ref 1.2–2.2)
ALP SERPL-CCNC: 66 IU/L (ref 39–117)
ALT SERPL-CCNC: 11 IU/L (ref 0–44)
AST SERPL-CCNC: 23 IU/L (ref 0–40)
BILIRUB SERPL-MCNC: 0.4 MG/DL (ref 0–1.2)
BUN SERPL-MCNC: 15 MG/DL (ref 8–27)
BUN/CREAT SERPL: 13 (ref 10–24)
CALCIUM SERPL-MCNC: 9.3 MG/DL (ref 8.6–10.2)
CHLORIDE SERPL-SCNC: 102 MMOL/L (ref 96–106)
CHOLEST SERPL-MCNC: 181 MG/DL (ref 100–199)
CO2 SERPL-SCNC: 19 MMOL/L (ref 20–29)
CREAT SERPL-MCNC: 1.14 MG/DL (ref 0.76–1.27)
GLOBULIN SER-MCNC: 2.4 G/DL (ref 1.5–4.5)
GLUCOSE SERPL-MCNC: 106 MG/DL (ref 65–99)
HBA1C MFR BLD: 6.4 % (ref 4.8–5.6)
HDLC SERPL-MCNC: 43 MG/DL
LDLC SERPL CALC-MCNC: 109 MG/DL (ref 0–99)
MICRODELETION SYND BLD/T FISH: NORMAL
POTASSIUM SERPL-SCNC: 4.3 MMOL/L (ref 3.5–5.2)
PROT SERPL-MCNC: 6.9 G/DL (ref 6–8.5)
PSA SERPL-MCNC: <0.1 NG/ML (ref 0–4)
SL AMB EGFR AFRICAN AMERICAN: 67 ML/MIN/1.73
SL AMB EGFR NON AFRICAN AMERICAN: 58 ML/MIN/1.73
SODIUM SERPL-SCNC: 143 MMOL/L (ref 134–144)
TRIGL SERPL-MCNC: 145 MG/DL (ref 0–149)

## 2020-01-13 ENCOUNTER — OFFICE VISIT (OUTPATIENT)
Dept: PODIATRY | Facility: CLINIC | Age: 85
End: 2020-01-13
Payer: COMMERCIAL

## 2020-01-13 VITALS
BODY MASS INDEX: 27.09 KG/M2 | SYSTOLIC BLOOD PRESSURE: 110 MMHG | WEIGHT: 200 LBS | HEIGHT: 72 IN | HEART RATE: 65 BPM | DIASTOLIC BLOOD PRESSURE: 60 MMHG | RESPIRATION RATE: 16 BRPM

## 2020-01-13 DIAGNOSIS — M79.672 PAIN IN BOTH FEET: ICD-10-CM

## 2020-01-13 DIAGNOSIS — M79.671 PAIN IN BOTH FEET: ICD-10-CM

## 2020-01-13 DIAGNOSIS — B35.1 ONYCHOMYCOSIS: ICD-10-CM

## 2020-01-13 DIAGNOSIS — I70.209 PERIPHERAL ARTERIOSCLEROSIS (HCC): Primary | ICD-10-CM

## 2020-01-13 PROCEDURE — 11721 DEBRIDE NAIL 6 OR MORE: CPT | Performed by: PODIATRIST

## 2020-01-13 NOTE — PROGRESS NOTES
Assessment/Plan:  Pain upon ambulation   Peripheral artery disease   Ingrown toenail left hallux   Mycosis of nail and skin      Plan   Foot exam performed   Patient family educated on condition   All nails debrided           Subjective:  Patient complains of pain in his left big toe   He is concerned about the thickening of the nail   He also has pain with shoe wear   No history of trauma     No past medical history on file     ,         Patient Active Problem List   Diagnosis    Basal cell carcinoma of skin of scalp and neck    Carotid stenosis, bilateral    Chronic fatigue syndrome    Chronic pain of left knee    Edema    Benign essential hypertension    Fatigue    Heart murmur    Hyperlipidemia    Inguinal hernia    Lumbosacral radiculopathy    Muscle weakness    Paroxysmal atrial fibrillation (HCC)    History of prostate cancer    Short-term memory loss    Sick sinus syndrome (HCC)    Traumatic hemorrhage of cerebrum without loss of consciousness (HCC)    Exertional dyspnea    GERD (gastroesophageal reflux disease)    Basal cell carcinoma (BCC) of skin of nose    Medicare annual wellness visit, initial    IFG (impaired fasting glucose)                 He  has no past surgical history on file  His family history includes No Known Problems in his father and mother  He  reports that he quit smoking about 69 years ago  He has never used smokeless tobacco  He reports that he does not drink alcohol   His drug history is not on file               Current Outpatient Medications   Medication Sig Dispense Refill    amLODIPine-valsartan (EXFORGE) 5-160 MG per tablet Take 1 tablet by mouth daily 90 tablet 1    aspirin (ECOTRIN LOW STRENGTH) 81 mg EC tablet Take 81 mg by mouth daily        Bioflavonoid Products (KJ C PO) Take by mouth        Calcium Carb-Cholecalciferol 600-1000 MG-UNIT CAPS Take by mouth        Cholecalciferol (VITAMIN D3) 400 units CAPS Take by mouth daily          co-enzyme Q-10 100 mg capsule Take 100 mg by mouth daily          DEXILANT 60 MG capsule Take 60 mg by mouth daily        Lutein 20 MG TABS Take by mouth        metoprolol succinate (TOPROL-XL) 25 mg 24 hr tablet Take 1 tablet (25 mg total) by mouth daily 90 tablet 1    multivitamin (THERAGRAN) TABS Take 1 tablet by mouth daily        omega-3-acid ethyl esters (LOVAZA) 1 g capsule Take 2 g by mouth daily        Probiotic Product (PROBIOTIC-10 PO) Take by mouth        simvastatin (ZOCOR) 20 mg tablet Take 1 tablet (20 mg total) by mouth daily 90 tablet 1    SUPER B COMPLEX/C PO Take by mouth          No current facility-administered medications for this visit                  Review of Systems       Objective:  Patient's shoes and socks removed    Foot Exam     General  General Appearance: appears stated age and healthy   Orientation: alert and oriented to person, place, and time   Affect: appropriate   Gait: antalgic         Right Foot/Ankle      Inspection and Palpation  Ecchymosis: none  Swelling: dorsum and metatarsals   Arch: pes planus  Hammertoes: fifth toe  Hallux valgus: no  Hallux limitus: yes  Skin Exam: dry skin and tinea;      Neurovascular  Dorsalis pedis: 1+  Posterior tibial: 1+  Superficial peroneal nerve sensation: diminished  Deep peroneal nerve sensation: diminished        Left Foot/Ankle       Inspection and Palpation  Ecchymosis: none  Swelling: dorsum and metatarsals   Arch: pes planus  Hammertoes: fifth toe  Hallux valgus: no  Hallux limitus: yes  Skin Exam: dry skin and tinea;      Neurovascular  Dorsalis pedis: 1+  Superficial peroneal nerve sensation: diminished  Deep peroneal nerve sensation: diminished           Physical Exam   Constitutional: He appears well-developed and well-nourished  Cardiovascular: Normal rate and regular rhythm  Pulses:       Dorsalis pedis pulses are 1+ on the right side, and 1+ on the left side         Posterior tibial pulses are 1+ on the right side  Feet:   Right Foot:   Skin Integrity: Positive for dry skin  Left Foot:   Skin Integrity: Positive for dry skin  Skin: Skin is dry   Capillary refill takes more than 3 seconds    Significant xerosis of skin noted bilateral   This is secondary to chronic moccasin tinea pedis   All nails are dystrophic left hallux is ingrown in the fibular aspect   Both hallux nails demonstrate mycosis   Vitals reviewed

## 2020-02-17 ENCOUNTER — OFFICE VISIT (OUTPATIENT)
Dept: FAMILY MEDICINE CLINIC | Facility: CLINIC | Age: 85
End: 2020-02-17
Payer: COMMERCIAL

## 2020-02-17 VITALS
HEART RATE: 58 BPM | WEIGHT: 197 LBS | TEMPERATURE: 98.2 F | HEIGHT: 72 IN | RESPIRATION RATE: 18 BRPM | OXYGEN SATURATION: 98 % | DIASTOLIC BLOOD PRESSURE: 70 MMHG | BODY MASS INDEX: 26.68 KG/M2 | SYSTOLIC BLOOD PRESSURE: 110 MMHG

## 2020-02-17 DIAGNOSIS — S50.01XA CONTUSION OF RIGHT ELBOW, INITIAL ENCOUNTER: Primary | ICD-10-CM

## 2020-02-17 PROCEDURE — 1036F TOBACCO NON-USER: CPT | Performed by: FAMILY MEDICINE

## 2020-02-17 PROCEDURE — 3078F DIAST BP <80 MM HG: CPT | Performed by: FAMILY MEDICINE

## 2020-02-17 PROCEDURE — 4040F PNEUMOC VAC/ADMIN/RCVD: CPT | Performed by: FAMILY MEDICINE

## 2020-02-17 PROCEDURE — 3074F SYST BP LT 130 MM HG: CPT | Performed by: FAMILY MEDICINE

## 2020-02-17 PROCEDURE — 99213 OFFICE O/P EST LOW 20 MIN: CPT | Performed by: FAMILY MEDICINE

## 2020-02-17 PROCEDURE — 1160F RVW MEDS BY RX/DR IN RCRD: CPT | Performed by: FAMILY MEDICINE

## 2020-02-17 PROCEDURE — 3008F BODY MASS INDEX DOCD: CPT | Performed by: FAMILY MEDICINE

## 2020-02-17 RX ORDER — PANTOPRAZOLE SODIUM 40 MG/1
TABLET, DELAYED RELEASE ORAL DAILY
COMMUNITY
Start: 2020-01-17

## 2020-02-17 NOTE — PROGRESS NOTES
Assessment/Plan:    1  Contusion of right elbow, initial encounter  Comments:  New, supportive measures reviewed, ice as needed; strongly encouraged him to use his walker           There are no Patient Instructions on file for this visit  Return if symptoms worsen or fail to improve  Subjective:      Patient ID: Hannah Melissa is a 80 y o  male  Chief Complaint   Patient presents with    fell yesterday hurt R hip and elbow     area hurts  [id number]       Patient fell in his home yesterday coming inside  He landed on his right elbow and hip  He is able to move his arm and walk, but is sore  The following portions of the patient's history were reviewed and updated as appropriate:  past social history    Review of Systems      Current Outpatient Medications   Medication Sig Dispense Refill    amLODIPine-valsartan (EXFORGE) 5-160 MG per tablet Take 1 tablet by mouth daily 90 tablet 1    aspirin (ECOTRIN LOW STRENGTH) 81 mg EC tablet Take 81 mg by mouth daily      Bioflavonoid Products (KJ C PO) Take by mouth      Calcium Carb-Cholecalciferol 600-1000 MG-UNIT CAPS Take by mouth      Cholecalciferol (VITAMIN D3) 400 units CAPS Take by mouth daily        co-enzyme Q-10 100 mg capsule Take 100 mg by mouth daily        Lutein 20 MG TABS Take by mouth      metoprolol succinate (TOPROL-XL) 25 mg 24 hr tablet Take 1 tablet (25 mg total) by mouth daily 90 tablet 1    multivitamin (THERAGRAN) TABS Take 1 tablet by mouth daily      Omega-3 Fatty Acids (FISH OIL OMEGA-3 PO) Take 2 g by mouth daily      pantoprazole (PROTONIX) 40 mg tablet daily      Probiotic Product (PROBIOTIC-10 PO) Take by mouth      simvastatin (ZOCOR) 20 mg tablet Take 1 tablet (20 mg total) by mouth daily 90 tablet 1    SUPER B COMPLEX/C PO Take by mouth      DEXILANT 60 MG capsule Take 60 mg by mouth daily       No current facility-administered medications for this visit          Objective:    /70   Pulse 58   Temp 98 2 °F (36 8 °C)   Resp 18   Ht 5' 11 5" (1 816 m)   Wt 89 4 kg (197 lb)   SpO2 98%   BMI 27 09 kg/m²      Physical Exam   Constitutional: He appears well-developed and well-nourished  Cardiovascular: Normal rate, regular rhythm and normal heart sounds  Pulmonary/Chest: Effort normal and breath sounds normal  No respiratory distress  He has no wheezes  Musculoskeletal:   Slow gait, normal range of motion of right hip  Skin:   Abrasion right olecranon process   Nursing note and vitals reviewed            Hayden Freitas DO

## 2020-03-29 DIAGNOSIS — I10 BENIGN ESSENTIAL HYPERTENSION: ICD-10-CM

## 2020-03-30 RX ORDER — METOPROLOL SUCCINATE 25 MG/1
TABLET, EXTENDED RELEASE ORAL
Qty: 90 TABLET | Refills: 1 | Status: SHIPPED | OUTPATIENT
Start: 2020-03-30 | End: 2020-07-10 | Stop reason: SDUPTHER

## 2020-05-01 ENCOUNTER — OFFICE VISIT (OUTPATIENT)
Dept: PODIATRY | Facility: CLINIC | Age: 85
End: 2020-05-01
Payer: COMMERCIAL

## 2020-05-01 VITALS
RESPIRATION RATE: 16 BRPM | DIASTOLIC BLOOD PRESSURE: 70 MMHG | HEIGHT: 72 IN | BODY MASS INDEX: 26.68 KG/M2 | WEIGHT: 197 LBS | HEART RATE: 58 BPM | SYSTOLIC BLOOD PRESSURE: 110 MMHG

## 2020-05-01 DIAGNOSIS — M79.671 PAIN IN BOTH FEET: ICD-10-CM

## 2020-05-01 DIAGNOSIS — I70.209 PERIPHERAL ARTERIOSCLEROSIS (HCC): Primary | ICD-10-CM

## 2020-05-01 DIAGNOSIS — M79.672 PAIN IN BOTH FEET: ICD-10-CM

## 2020-05-01 DIAGNOSIS — B35.1 ONYCHOMYCOSIS: ICD-10-CM

## 2020-05-01 PROCEDURE — 11721 DEBRIDE NAIL 6 OR MORE: CPT | Performed by: PODIATRIST

## 2020-07-02 DIAGNOSIS — I10 BENIGN ESSENTIAL HYPERTENSION: ICD-10-CM

## 2020-07-07 RX ORDER — AMLODIPINE AND VALSARTAN 5; 160 MG/1; MG/1
1 TABLET ORAL DAILY
Qty: 90 TABLET | Refills: 1 | Status: SHIPPED | OUTPATIENT
Start: 2020-07-07

## 2020-07-10 ENCOUNTER — OFFICE VISIT (OUTPATIENT)
Dept: FAMILY MEDICINE CLINIC | Facility: CLINIC | Age: 85
End: 2020-07-10
Payer: COMMERCIAL

## 2020-07-10 VITALS
HEART RATE: 62 BPM | BODY MASS INDEX: 27.09 KG/M2 | HEIGHT: 72 IN | WEIGHT: 200 LBS | DIASTOLIC BLOOD PRESSURE: 74 MMHG | SYSTOLIC BLOOD PRESSURE: 126 MMHG | RESPIRATION RATE: 18 BRPM | TEMPERATURE: 97.9 F

## 2020-07-10 DIAGNOSIS — E78.5 HYPERLIPIDEMIA, UNSPECIFIED HYPERLIPIDEMIA TYPE: ICD-10-CM

## 2020-07-10 DIAGNOSIS — Z85.46 HISTORY OF PROSTATE CANCER: ICD-10-CM

## 2020-07-10 DIAGNOSIS — M70.21 OLECRANON BURSITIS, RIGHT ELBOW: ICD-10-CM

## 2020-07-10 DIAGNOSIS — I10 BENIGN ESSENTIAL HYPERTENSION: Primary | ICD-10-CM

## 2020-07-10 DIAGNOSIS — R73.01 IFG (IMPAIRED FASTING GLUCOSE): ICD-10-CM

## 2020-07-10 DIAGNOSIS — I48.0 PAROXYSMAL ATRIAL FIBRILLATION (HCC): ICD-10-CM

## 2020-07-10 PROBLEM — C61 PROSTATE CANCER (HCC): Status: ACTIVE | Noted: 2020-07-10

## 2020-07-10 PROCEDURE — 3008F BODY MASS INDEX DOCD: CPT | Performed by: FAMILY MEDICINE

## 2020-07-10 PROCEDURE — 99214 OFFICE O/P EST MOD 30 MIN: CPT | Performed by: FAMILY MEDICINE

## 2020-07-10 PROCEDURE — 3074F SYST BP LT 130 MM HG: CPT | Performed by: FAMILY MEDICINE

## 2020-07-10 PROCEDURE — 1036F TOBACCO NON-USER: CPT | Performed by: FAMILY MEDICINE

## 2020-07-10 PROCEDURE — 1160F RVW MEDS BY RX/DR IN RCRD: CPT | Performed by: FAMILY MEDICINE

## 2020-07-10 PROCEDURE — 3078F DIAST BP <80 MM HG: CPT | Performed by: FAMILY MEDICINE

## 2020-07-10 PROCEDURE — 4040F PNEUMOC VAC/ADMIN/RCVD: CPT | Performed by: FAMILY MEDICINE

## 2020-07-10 RX ORDER — METOPROLOL SUCCINATE 25 MG/1
25 TABLET, EXTENDED RELEASE ORAL DAILY
Qty: 90 TABLET | Refills: 1 | Status: SHIPPED | OUTPATIENT
Start: 2020-07-10 | End: 2020-11-24

## 2020-07-10 RX ORDER — ATORVASTATIN CALCIUM 10 MG/1
10 TABLET, FILM COATED ORAL DAILY
Qty: 90 TABLET | Refills: 1 | Status: SHIPPED | OUTPATIENT
Start: 2020-07-10 | End: 2020-11-16

## 2020-07-10 NOTE — PROGRESS NOTES
Assessment/Plan:    No problem-specific Assessment & Plan notes found for this encounter  Gait issues, offer placard if requested, shuffle gait, needs wife's help with ambulation    Fell 1w on right elbow  Swelling  Normal rom  Bursitis present, f/u aspiration in future if desired  Ice  wrap    htn stable  Labs q6m  Use of statins for the purposes of CVD/CVA risks reduction was advised according to USPSTF guidelines along with appropriate continued liver monitoring  Gerd stable, sees gi  Recent data suggesting increased risk of ischemic CVA and chronic kidney damage with PPI use was advised  Change lipitor to zocor for norvasc safety     Diagnoses and all orders for this visit:    Benign essential hypertension  -     Comprehensive metabolic panel; Future  -     Comprehensive metabolic panel; Future  -     metoprolol succinate (TOPROL-XL) 25 mg 24 hr tablet; Take 1 tablet (25 mg total) by mouth daily    Hyperlipidemia, unspecified hyperlipidemia type  -     Lipid Panel with Direct LDL reflex; Future  -     atorvastatin (LIPITOR) 10 mg tablet; Take 1 tablet (10 mg total) by mouth daily    IFG (impaired fasting glucose)  -     Comprehensive metabolic panel; Future  -     Hemoglobin A1C; Future  -     Hemoglobin A1C; Future    Paroxysmal atrial fibrillation (HCC)    History of prostate cancer  -     PSA, Total Screen; Future    Olecranon bursitis, right elbow              Return in about 6 months (around 1/14/2021) for Recheck  Subjective:      Patient ID: Mary Jane Ayala is a 80 y o  male      Chief Complaint   Patient presents with    Hypertension     [facility] lpn       HPI    Local cardio Dr Bazan Oas  Hx of PAF  No cp or palpitations    Gi Dr Aden Magallanes, long term risk of PPI aware  on pantoprazole  Was on dexilant    Tolerates meds  Norvasc/zocor risks advised  Some edema  No sob    Wife limits desserts but he likes them  Hard to be diet compliant per wife and pt    The following portions of the patient's history were reviewed and updated as appropriate: allergies, current medications, past family history, past medical history, past social history, past surgical history and problem list     Review of Systems   Respiratory: Negative for shortness of breath  Cardiovascular: Positive for leg swelling  Negative for chest pain  Current Outpatient Medications   Medication Sig Dispense Refill    amLODIPine-valsartan (EXFORGE) 5-160 MG per tablet TAKE 1 TABLET BY MOUTH  DAILY 90 tablet 1    aspirin (ECOTRIN LOW STRENGTH) 81 mg EC tablet Take 81 mg by mouth daily      Bioflavonoid Products (KJ C PO) Take by mouth      Calcium Carb-Cholecalciferol 600-1000 MG-UNIT CAPS Take by mouth      Cholecalciferol (VITAMIN D3) 400 units CAPS Take by mouth daily        co-enzyme Q-10 100 mg capsule Take 100 mg by mouth daily        Lutein 20 MG TABS Take by mouth      metoprolol succinate (TOPROL-XL) 25 mg 24 hr tablet Take 1 tablet (25 mg total) by mouth daily 90 tablet 1    multivitamin (THERAGRAN) TABS Take 1 tablet by mouth daily      Omega-3 Fatty Acids (FISH OIL OMEGA-3 PO) Take 2 g by mouth daily      pantoprazole (PROTONIX) 40 mg tablet daily      Probiotic Product (PROBIOTIC-10 PO) Take by mouth      SUPER B COMPLEX/C PO Take by mouth      atorvastatin (LIPITOR) 10 mg tablet Take 1 tablet (10 mg total) by mouth daily 90 tablet 1     No current facility-administered medications for this visit  Objective:    /74   Pulse 62   Temp 97 9 °F (36 6 °C)   Resp 18   Ht 5' 11 5" (1 816 m)   Wt 90 7 kg (200 lb)   BMI 27 51 kg/m²        Physical Exam   Constitutional: He appears well-developed  HENT:   Head: Normocephalic  Right Ear: External ear normal    Left Ear: External ear normal    Mouth/Throat: No oropharyngeal exudate  Eyes: Conjunctivae are normal  No scleral icterus  Neck: Neck supple  Carotid bruit is not present  No thyromegaly present     Cardiovascular: Normal rate, regular rhythm and intact distal pulses  Pulmonary/Chest: Effort normal and breath sounds normal  No respiratory distress  He has no rales  Abdominal: Soft  Bowel sounds are normal  He exhibits no distension  There is no tenderness  Musculoskeletal: He exhibits edema  He exhibits no deformity  Neurological: He is alert  Skin: Skin is warm and dry  No pallor  Psychiatric: His behavior is normal  Thought content normal    Nursing note and vitals reviewed  BMI Counseling: Body mass index is 27 51 kg/m²  The BMI is above normal  Nutrition recommendations include decreasing portion sizes and moderation in carbohydrate intake  Exercise recommendations include exercising 3-5 times per week  No pharmacotherapy was ordered  Falls Plan of Care: balance, strength, and gait training instructions were provided              Radha Chun DO

## 2020-07-10 NOTE — PATIENT INSTRUCTIONS
For extra safety, we can change the simvastatin 20mg daily to atorvastatin 10mg daily since it is more compatible with the amlodipine

## 2020-07-14 ENCOUNTER — OFFICE VISIT (OUTPATIENT)
Dept: PODIATRY | Facility: CLINIC | Age: 85
End: 2020-07-14
Payer: COMMERCIAL

## 2020-07-14 VITALS
HEART RATE: 62 BPM | HEIGHT: 72 IN | DIASTOLIC BLOOD PRESSURE: 74 MMHG | SYSTOLIC BLOOD PRESSURE: 126 MMHG | WEIGHT: 200 LBS | BODY MASS INDEX: 27.09 KG/M2 | RESPIRATION RATE: 17 BRPM

## 2020-07-14 DIAGNOSIS — I70.209 PERIPHERAL ARTERIOSCLEROSIS (HCC): Primary | ICD-10-CM

## 2020-07-14 DIAGNOSIS — B35.1 ONYCHOMYCOSIS: ICD-10-CM

## 2020-07-14 DIAGNOSIS — M79.671 PAIN IN BOTH FEET: ICD-10-CM

## 2020-07-14 DIAGNOSIS — M79.672 PAIN IN BOTH FEET: ICD-10-CM

## 2020-07-14 PROCEDURE — 11721 DEBRIDE NAIL 6 OR MORE: CPT | Performed by: PODIATRIST

## 2020-07-14 NOTE — PROGRESS NOTES
Assessment/Plan:  Pain upon ambulation   Peripheral artery disease   Ingrown toenail left hallux   Mycosis of nail and skin      Plan   Foot exam performed   Patient family educated on condition   All nails debrided   This was done without pain or complication        Subjective:  Patient complains of pain in his left big toe   He is concerned about the thickening of the nail   He also has pain with shoe wear   No history of trauma         ,         Patient Active Problem List   Diagnosis    Basal cell carcinoma of skin of scalp and neck    Carotid stenosis, bilateral    Chronic fatigue syndrome    Chronic pain of left knee    Edema    Benign essential hypertension    Fatigue    Heart murmur    Hyperlipidemia    Inguinal hernia    Lumbosacral radiculopathy    Muscle weakness    Paroxysmal atrial fibrillation (HCC)    History of prostate cancer    Short-term memory loss    Sick sinus syndrome (HCC)    Traumatic hemorrhage of cerebrum without loss of consciousness (HCC)    Exertional dyspnea    GERD (gastroesophageal reflux disease)    Basal cell carcinoma (BCC) of skin of nose    Medicare annual wellness visit, initial    IFG (impaired fasting glucose)                  He  has no past surgical history on file  His family history includes No Known Problems in his father and mother  He  reports that he quit smoking about 69 years ago  He has never used smokeless tobacco  He reports that he does not drink alcohol   His drug history is not on file               Current Outpatient Medications   Medication Sig Dispense Refill    amLODIPine-valsartan (EXFORGE) 5-160 MG per tablet Take 1 tablet by mouth daily 90 tablet 1    aspirin (ECOTRIN LOW STRENGTH) 81 mg EC tablet Take 81 mg by mouth daily        Bioflavonoid Products (KJ C PO) Take by mouth        Calcium Carb-Cholecalciferol 600-1000 MG-UNIT CAPS Take by mouth        Cholecalciferol (VITAMIN D3) 400 units CAPS Take by mouth daily        co-enzyme Q-10 100 mg capsule Take 100 mg by mouth daily          DEXILANT 60 MG capsule Take 60 mg by mouth daily        Lutein 20 MG TABS Take by mouth        metoprolol succinate (TOPROL-XL) 25 mg 24 hr tablet Take 1 tablet (25 mg total) by mouth daily 90 tablet 1    multivitamin (THERAGRAN) TABS Take 1 tablet by mouth daily        omega-3-acid ethyl esters (LOVAZA) 1 g capsule Take 2 g by mouth daily        Probiotic Product (PROBIOTIC-10 PO) Take by mouth        simvastatin (ZOCOR) 20 mg tablet Take 1 tablet (20 mg total) by mouth daily 90 tablet 1    SUPER B COMPLEX/C PO Take by mouth          No current facility-administered medications for this visit                    Review of Systems       Objective:  Patient's shoes and socks removed    Foot Exam     General  General Appearance: appears stated age and healthy   Orientation: alert and oriented to person, place, and time   Affect: appropriate   Gait: antalgic         Right Foot/Ankle      Inspection and Palpation  Ecchymosis: none  Swelling: dorsum and metatarsals   Arch: pes planus  Hammertoes: fifth toe  Hallux valgus: no  Hallux limitus: yes  Skin Exam: dry skin and tinea;      Neurovascular  Dorsalis pedis: 1+  Posterior tibial: 1+  Superficial peroneal nerve sensation: diminished  Deep peroneal nerve sensation: diminished        Left Foot/Ankle       Inspection and Palpation  Ecchymosis: none  Swelling: dorsum and metatarsals   Arch: pes planus  Hammertoes: fifth toe  Hallux valgus: no  Hallux limitus: yes  Skin Exam: dry skin and tinea;      Neurovascular  Dorsalis pedis: 1+  Superficial peroneal nerve sensation: diminished  Deep peroneal nerve sensation: diminished           Physical Exam   Constitutional: He appears well-developed and well-nourished  Cardiovascular: Normal rate and regular rhythm     Pulses:       Dorsalis pedis pulses are 1+ on the right side, and 1+ on the left side         Posterior tibial pulses are 1+ on the right side    Feet:   Right Foot:   Skin Integrity: Positive for dry skin  Left Foot:   Skin Integrity: Positive for dry skin  Skin: Skin is dry   Capillary refill takes more than 3 seconds    Significant xerosis of skin noted bilateral   This is secondary to chronic moccasin tinea pedis   All nails are dystrophic left hallux is ingrown in the fibular aspect   Both hallux nails demonstrate mycosis   Vitals reviewed

## 2020-07-16 LAB
ALBUMIN SERPL-MCNC: 4.3 G/DL (ref 3.6–4.6)
ALBUMIN/GLOB SERPL: 1.8 {RATIO} (ref 1.2–2.2)
ALP SERPL-CCNC: 73 IU/L (ref 39–117)
ALT SERPL-CCNC: 13 IU/L (ref 0–44)
AST SERPL-CCNC: 23 IU/L (ref 0–40)
BILIRUB SERPL-MCNC: 0.7 MG/DL (ref 0–1.2)
BUN SERPL-MCNC: 14 MG/DL (ref 8–27)
BUN/CREAT SERPL: 14 (ref 10–24)
CALCIUM SERPL-MCNC: 9 MG/DL (ref 8.6–10.2)
CHLORIDE SERPL-SCNC: 101 MMOL/L (ref 96–106)
CO2 SERPL-SCNC: 23 MMOL/L (ref 20–29)
CREAT SERPL-MCNC: 1.01 MG/DL (ref 0.76–1.27)
GLOBULIN SER-MCNC: 2.4 G/DL (ref 1.5–4.5)
GLUCOSE SERPL-MCNC: 97 MG/DL (ref 65–99)
HBA1C MFR BLD: 6.5 % (ref 4.8–5.6)
POTASSIUM SERPL-SCNC: 4.5 MMOL/L (ref 3.5–5.2)
PROT SERPL-MCNC: 6.7 G/DL (ref 6–8.5)
SL AMB EGFR AFRICAN AMERICAN: 77 ML/MIN/1.73
SL AMB EGFR NON AFRICAN AMERICAN: 67 ML/MIN/1.73
SODIUM SERPL-SCNC: 137 MMOL/L (ref 134–144)

## 2020-07-17 ENCOUNTER — OFFICE VISIT (OUTPATIENT)
Dept: FAMILY MEDICINE CLINIC | Facility: CLINIC | Age: 85
End: 2020-07-17
Payer: COMMERCIAL

## 2020-07-17 VITALS
HEART RATE: 88 BPM | WEIGHT: 200 LBS | DIASTOLIC BLOOD PRESSURE: 60 MMHG | TEMPERATURE: 97 F | SYSTOLIC BLOOD PRESSURE: 122 MMHG | HEIGHT: 72 IN | BODY MASS INDEX: 27.09 KG/M2 | RESPIRATION RATE: 16 BRPM | OXYGEN SATURATION: 98 %

## 2020-07-17 DIAGNOSIS — R26.9 GAIT DISORDER: ICD-10-CM

## 2020-07-17 DIAGNOSIS — M70.21 OLECRANON BURSITIS, RIGHT ELBOW: Primary | ICD-10-CM

## 2020-07-17 DIAGNOSIS — I10 BENIGN ESSENTIAL HYPERTENSION: ICD-10-CM

## 2020-07-17 PROCEDURE — 3074F SYST BP LT 130 MM HG: CPT | Performed by: FAMILY MEDICINE

## 2020-07-17 PROCEDURE — 3078F DIAST BP <80 MM HG: CPT | Performed by: FAMILY MEDICINE

## 2020-07-17 PROCEDURE — 4040F PNEUMOC VAC/ADMIN/RCVD: CPT | Performed by: FAMILY MEDICINE

## 2020-07-17 PROCEDURE — 1036F TOBACCO NON-USER: CPT | Performed by: FAMILY MEDICINE

## 2020-07-17 PROCEDURE — 3008F BODY MASS INDEX DOCD: CPT | Performed by: FAMILY MEDICINE

## 2020-07-17 PROCEDURE — 1160F RVW MEDS BY RX/DR IN RCRD: CPT | Performed by: FAMILY MEDICINE

## 2020-07-17 PROCEDURE — 20605 DRAIN/INJ JOINT/BURSA W/O US: CPT | Performed by: FAMILY MEDICINE

## 2020-07-17 PROCEDURE — 99214 OFFICE O/P EST MOD 30 MIN: CPT | Performed by: FAMILY MEDICINE

## 2020-07-17 RX ORDER — TRIAMCINOLONE ACETONIDE 40 MG/ML
20 INJECTION, SUSPENSION INTRA-ARTICULAR; INTRAMUSCULAR
Status: COMPLETED | OUTPATIENT
Start: 2020-07-17 | End: 2020-07-17

## 2020-07-17 RX ADMIN — TRIAMCINOLONE ACETONIDE 20 MG: 40 INJECTION, SUSPENSION INTRA-ARTICULAR; INTRAMUSCULAR at 11:20

## 2020-07-17 NOTE — PROGRESS NOTES
Assessment/Plan:    No problem-specific Assessment & Plan notes found for this encounter  12ml aspiration, bloody fluid  Ice prn, tylenol, wrap  10mg kenalog injected, tolerated well    Gait disorder unchanged, fall precautions, handicap placard    htn stable     Diagnoses and all orders for this visit:    Olecranon bursitis, right elbow  -     Medium joint arthrocentesis: R elbow    Benign essential hypertension    Gait disorder    Other orders  -     Cancel: Large joint arthrocentesis        Return if symptoms worsen or fail to improve  Subjective:      Patient ID: Reji Jj is a 80 y o  male  Chief Complaint   Patient presents with    Elbow Pain     prcma       HPI  2-3w ago  Odell Cart on elbow  Swelling noted  No stiffness  No shoulder or neck pain  No elbow rom pain  No f/c    htn stable    Unable to walk 100ft w/o stopping to rest, needs assistance with ambulation    The following portions of the patient's history were reviewed and updated as appropriate: allergies, current medications, past family history, past medical history, past social history, past surgical history and problem list     Review of Systems   Constitutional: Negative for fever  Respiratory: Negative for shortness of breath            Current Outpatient Medications   Medication Sig Dispense Refill    amLODIPine-valsartan (EXFORGE) 5-160 MG per tablet TAKE 1 TABLET BY MOUTH  DAILY 90 tablet 1    aspirin (ECOTRIN LOW STRENGTH) 81 mg EC tablet Take 81 mg by mouth daily      atorvastatin (LIPITOR) 10 mg tablet Take 1 tablet (10 mg total) by mouth daily 90 tablet 1    Bioflavonoid Products (KJ C PO) Take by mouth      Calcium Carb-Cholecalciferol 600-1000 MG-UNIT CAPS Take by mouth      Cholecalciferol (VITAMIN D3) 400 units CAPS Take by mouth daily        co-enzyme Q-10 100 mg capsule Take 100 mg by mouth daily        Lutein 20 MG TABS Take by mouth      metoprolol succinate (TOPROL-XL) 25 mg 24 hr tablet Take 1 tablet (25 mg total) by mouth daily 90 tablet 1    multivitamin (THERAGRAN) TABS Take 1 tablet by mouth daily      Omega-3 Fatty Acids (FISH OIL OMEGA-3 PO) Take 2 g by mouth daily      pantoprazole (PROTONIX) 40 mg tablet daily      Probiotic Product (PROBIOTIC-10 PO) Take by mouth      SUPER B COMPLEX/C PO Take by mouth       No current facility-administered medications for this visit  Objective:    /60   Pulse 88   Temp (!) 97 °F (36 1 °C)   Resp 16   Ht 5' 11 5" (1 816 m)   Wt 90 7 kg (200 lb)   SpO2 98%   BMI 27 51 kg/m²        Physical Exam   Constitutional: He appears well-developed  No distress  HENT:   Head: Normocephalic  Right Ear: External ear normal    Left Ear: External ear normal    Mouth/Throat: No oropharyngeal exudate  Eyes: Conjunctivae are normal  No scleral icterus  Neck: Neck supple  Cardiovascular: Normal rate, regular rhythm and intact distal pulses  Pulmonary/Chest: Effort normal and breath sounds normal  No respiratory distress  Abdominal: Soft  Bowel sounds are normal  He exhibits no distension  Musculoskeletal: He exhibits deformity  He exhibits no edema or tenderness  Right olecranon bursitis, very slow gait   Neurological: He is alert  He exhibits normal muscle tone  Skin: Skin is warm and dry  Psychiatric: His behavior is normal  Thought content normal    Nursing note and vitals reviewed        Medium joint arthrocentesis: R elbow  Date/Time: 7/17/2020 11:20 AM  Consent given by: patient  Site marked: site marked  Timeout: Immediately prior to procedure a time out was called to verify the correct patient, procedure, equipment, support staff and site/side marked as required   Supporting Documentation  Indications: pain and joint swelling   Procedure Details  Location: elbow - R elbow  Preparation: Patient was prepped and draped in the usual sterile fashion  Needle size: 20 G  Ultrasound guidance: no  Approach: anterolateral  Medications administered: 20 mg triamcinolone acetonide 40 mg/mL    Aspirate: bloody    Patient tolerance: patient tolerated the procedure well with no immediate complications  Dressing:  Sterile dressing applied               Vernadine DO Benji

## 2020-07-18 DIAGNOSIS — R26.9 GAIT DISORDER: Primary | ICD-10-CM

## 2020-08-26 ENCOUNTER — OFFICE VISIT (OUTPATIENT)
Dept: CARDIOLOGY CLINIC | Facility: CLINIC | Age: 85
End: 2020-08-26
Payer: COMMERCIAL

## 2020-08-26 VITALS
SYSTOLIC BLOOD PRESSURE: 108 MMHG | BODY MASS INDEX: 26.55 KG/M2 | HEIGHT: 72 IN | TEMPERATURE: 97.8 F | HEART RATE: 64 BPM | OXYGEN SATURATION: 97 % | DIASTOLIC BLOOD PRESSURE: 48 MMHG | WEIGHT: 196 LBS

## 2020-08-26 DIAGNOSIS — I49.5 SICK SINUS SYNDROME (HCC): ICD-10-CM

## 2020-08-26 DIAGNOSIS — R06.00 EXERTIONAL DYSPNEA: ICD-10-CM

## 2020-08-26 DIAGNOSIS — I48.11 LONGSTANDING PERSISTENT ATRIAL FIBRILLATION (HCC): Primary | ICD-10-CM

## 2020-08-26 DIAGNOSIS — I65.23 CAROTID STENOSIS, BILATERAL: ICD-10-CM

## 2020-08-26 PROCEDURE — 99214 OFFICE O/P EST MOD 30 MIN: CPT | Performed by: INTERNAL MEDICINE

## 2020-08-26 PROCEDURE — 3074F SYST BP LT 130 MM HG: CPT | Performed by: INTERNAL MEDICINE

## 2020-08-26 PROCEDURE — 93000 ELECTROCARDIOGRAM COMPLETE: CPT | Performed by: INTERNAL MEDICINE

## 2020-08-26 PROCEDURE — 1160F RVW MEDS BY RX/DR IN RCRD: CPT | Performed by: INTERNAL MEDICINE

## 2020-08-26 PROCEDURE — 1036F TOBACCO NON-USER: CPT | Performed by: INTERNAL MEDICINE

## 2020-08-26 PROCEDURE — 3008F BODY MASS INDEX DOCD: CPT | Performed by: INTERNAL MEDICINE

## 2020-08-26 PROCEDURE — 4040F PNEUMOC VAC/ADMIN/RCVD: CPT | Performed by: INTERNAL MEDICINE

## 2020-08-26 PROCEDURE — 3078F DIAST BP <80 MM HG: CPT | Performed by: INTERNAL MEDICINE

## 2020-09-22 ENCOUNTER — OFFICE VISIT (OUTPATIENT)
Dept: PODIATRY | Facility: CLINIC | Age: 85
End: 2020-09-22
Payer: COMMERCIAL

## 2020-09-22 VITALS — HEIGHT: 72 IN | BODY MASS INDEX: 26.55 KG/M2 | RESPIRATION RATE: 17 BRPM | WEIGHT: 196 LBS

## 2020-09-22 DIAGNOSIS — I70.209 PERIPHERAL ARTERIOSCLEROSIS (HCC): Primary | ICD-10-CM

## 2020-09-22 DIAGNOSIS — B35.1 ONYCHOMYCOSIS: ICD-10-CM

## 2020-09-22 DIAGNOSIS — M79.671 PAIN IN BOTH FEET: ICD-10-CM

## 2020-09-22 DIAGNOSIS — M79.672 PAIN IN BOTH FEET: ICD-10-CM

## 2020-09-22 PROCEDURE — 11721 DEBRIDE NAIL 6 OR MORE: CPT | Performed by: PODIATRIST

## 2020-09-29 ENCOUNTER — OFFICE VISIT (OUTPATIENT)
Dept: FAMILY MEDICINE CLINIC | Facility: CLINIC | Age: 85
End: 2020-09-29
Payer: COMMERCIAL

## 2020-09-29 VITALS
HEIGHT: 72 IN | TEMPERATURE: 95.7 F | WEIGHT: 191 LBS | BODY MASS INDEX: 25.87 KG/M2 | SYSTOLIC BLOOD PRESSURE: 120 MMHG | OXYGEN SATURATION: 96 % | RESPIRATION RATE: 16 BRPM | HEART RATE: 65 BPM | DIASTOLIC BLOOD PRESSURE: 60 MMHG

## 2020-09-29 DIAGNOSIS — S06.360S TRAUMATIC HEMORRHAGE OF CEREBRUM WITHOUT LOSS OF CONSCIOUSNESS, UNSPECIFIED LATERALITY, SEQUELA (HCC): ICD-10-CM

## 2020-09-29 DIAGNOSIS — M70.22 OLECRANON BURSITIS, LEFT ELBOW: Primary | ICD-10-CM

## 2020-09-29 DIAGNOSIS — R26.9 GAIT DISORDER: ICD-10-CM

## 2020-09-29 DIAGNOSIS — E78.5 HYPERLIPIDEMIA, UNSPECIFIED HYPERLIPIDEMIA TYPE: ICD-10-CM

## 2020-09-29 DIAGNOSIS — I10 BENIGN ESSENTIAL HYPERTENSION: ICD-10-CM

## 2020-09-29 PROCEDURE — 1160F RVW MEDS BY RX/DR IN RCRD: CPT | Performed by: FAMILY MEDICINE

## 2020-09-29 PROCEDURE — 3078F DIAST BP <80 MM HG: CPT | Performed by: FAMILY MEDICINE

## 2020-09-29 PROCEDURE — 1036F TOBACCO NON-USER: CPT | Performed by: FAMILY MEDICINE

## 2020-09-29 PROCEDURE — 3725F SCREEN DEPRESSION PERFORMED: CPT | Performed by: FAMILY MEDICINE

## 2020-09-29 PROCEDURE — 20605 DRAIN/INJ JOINT/BURSA W/O US: CPT | Performed by: FAMILY MEDICINE

## 2020-09-29 PROCEDURE — 99214 OFFICE O/P EST MOD 30 MIN: CPT | Performed by: FAMILY MEDICINE

## 2020-09-29 NOTE — PROGRESS NOTES
Assessment/Plan:    No problem-specific Assessment & Plan notes found for this encounter  Can call for PT order or neurologist, wife declines at this time  Suggestion for sitting exercises    htn stable, cont meds    Gait disorder, fall precautions reviewed    Brain injury, remains forgetful but no psychosis    Left olecranon bursitis new, 2ml of serous fluid aspirated, tolerated well, instructions given, ice bid-tid suggested, f/u if recurs     Diagnoses and all orders for this visit:    Olecranon bursitis, left elbow  -     Medium joint arthrocentesis: L olecranon bursa    Benign essential hypertension    Hyperlipidemia, unspecified hyperlipidemia type    Gait disorder    Traumatic hemorrhage of cerebrum without loss of consciousness, unspecified laterality, sequela ([facility] [address] )        Return if symptoms worsen or fail to improve  Subjective:      Patient ID: Corinna Aldrich is a 80 y o  male  Chief Complaint   Patient presents with    Elbow Pain     patient says he fell a week ago and hit his elbow(left)  appears to be swollen [id number]     Fatigue       HPI  Fell at home  1w   Saw him on floor   Was trying to get up from chair maybe  No yelling or LOC  No head injury or headache  Elbow pain sometime after  Worse last 2-4d  Left elbow  Pain to rest on  No pain with rom  Hard floor  Tries to be careful  PT in past after brain injury  Sits a lot  Does not drink much water per wife    The following portions of the patient's history were reviewed and updated as appropriate: allergies, current medications, past family history, past medical history, past social history, past surgical history and problem list     Review of Systems   Constitutional: Negative for chills and fever  Musculoskeletal: Negative for arthralgias  Neurological: Positive for weakness          Of legs         Current Outpatient Medications   Medication Sig Dispense Refill    amLODIPine-valsartan (EXFORGE) 5-160 MG per tablet TAKE 1 TABLET BY MOUTH  DAILY 90 tablet 1    aspirin (ECOTRIN LOW STRENGTH) 81 mg EC tablet Take 81 mg by mouth daily      atorvastatin (LIPITOR) 10 mg tablet Take 1 tablet (10 mg total) by mouth daily 90 tablet 1    Bioflavonoid Products (KJ C PO) Take by mouth      Calcium Carb-Cholecalciferol 600-1000 MG-UNIT CAPS Take by mouth      Cholecalciferol (VITAMIN D3) 400 units CAPS Take by mouth daily        co-enzyme Q-10 100 mg capsule Take 100 mg by mouth daily        [employer] (FOLLOW-UP) POWD HANDICAP PLACARD, medically recommended, <taxonomy [id number]> due to arthritic/orthopedic condition (#5) 1 Can 0    Lutein 20 MG TABS Take by mouth      metoprolol succinate (TOPROL-XL) 25 mg 24 hr tablet Take 1 tablet (25 mg total) by mouth daily 90 tablet 1    multivitamin (THERAGRAN) TABS Take 1 tablet by mouth daily      Omega-3 Fatty Acids (FISH OIL OMEGA-3 PO) Take 2 g by mouth daily      pantoprazole (PROTONIX) 40 mg tablet daily      Probiotic Product (PROBIOTIC-10 PO) Take by mouth      SUPER B COMPLEX/C PO Take by mouth       No current facility-administered medications for this visit  Objective:    /60   Pulse 65   Temp (!) 95 7 °F (35 4 °C)   Resp 16   Ht 5' 11 5" (1 816 m)   Wt 86 6 kg (191 lb)   SpO2 96%   BMI 26 27 kg/m²        Physical Exam  Vitals signs and nursing note reviewed  Constitutional:       General: He is not in acute distress  Appearance: He is well-developed  He is not ill-appearing  HENT:      Head: Normocephalic  Right Ear: Tympanic membrane and ear canal normal       Left Ear: Tympanic membrane and ear canal normal    Eyes:      General: No scleral icterus  Right eye: No discharge  Left eye: No discharge  Conjunctiva/sclera: Conjunctivae normal    Neck:      Musculoskeletal: Neck supple  No neck rigidity  Cardiovascular:      Rate and Rhythm: Normal rate and regular rhythm  Heart sounds: No murmur     Pulmonary:      Effort: Pulmonary effort is normal  No respiratory distress  Breath sounds: No wheezing  Abdominal:      General: There is no distension  Palpations: Abdomen is soft  Tenderness: There is no abdominal tenderness  Musculoskeletal:         General: Swelling present  No deformity  Comments: Left olecranon bursa, mild  No redness or warmth  Full elbow rom   Skin:     General: Skin is warm and dry  Neurological:      Mental Status: He is alert  Motor: No weakness  Gait: Gait abnormal    Psychiatric:         Behavior: Behavior normal          Thought Content:  Thought content normal        Medium joint arthrocentesis: L olecranon bursa  Date/Time: 9/29/2020 1:50 PM  Consent given by: patient and spouse  Site marked: site marked  Timeout: Immediately prior to procedure a time out was called to verify the correct patient, procedure, equipment, support staff and site/side marked as required   Supporting Documentation  Indications: pain and joint swelling   Procedure Details  Location: elbow - L olecranon bursa  Preparation: Patient was prepped and draped in the usual sterile fashion  Needle size: 22 G  Ultrasound guidance: no  Approach: posterolateral    Aspirate: serous    Patient tolerance: patient tolerated the procedure well with no immediate complications    Ice bid-tid                 Demi Little, DO

## 2020-09-30 PROBLEM — M70.21 OLECRANON BURSITIS, RIGHT ELBOW: Status: RESOLVED | Noted: 2020-07-10 | Resolved: 2020-09-30

## 2020-11-05 ENCOUNTER — HOSPITAL ENCOUNTER (EMERGENCY)
Facility: HOSPITAL | Age: 85
Discharge: HOME | End: 2020-11-05
Attending: EMERGENCY MEDICINE
Payer: COMMERCIAL

## 2020-11-05 ENCOUNTER — APPOINTMENT (EMERGENCY)
Dept: RADIOLOGY | Facility: HOSPITAL | Age: 85
End: 2020-11-05
Payer: COMMERCIAL

## 2020-11-05 ENCOUNTER — TELEPHONE (OUTPATIENT)
Dept: FAMILY MEDICINE CLINIC | Facility: CLINIC | Age: 85
End: 2020-11-05

## 2020-11-05 VITALS
RESPIRATION RATE: 16 BRPM | OXYGEN SATURATION: 98 % | TEMPERATURE: 97.6 F | SYSTOLIC BLOOD PRESSURE: 153 MMHG | BODY MASS INDEX: 26.53 KG/M2 | DIASTOLIC BLOOD PRESSURE: 73 MMHG | WEIGHT: 192.9 LBS | HEART RATE: 62 BPM

## 2020-11-05 DIAGNOSIS — R53.1 WEAKNESS: Primary | ICD-10-CM

## 2020-11-05 LAB
ALBUMIN SERPL BCP-MCNC: 3.5 G/DL (ref 3.5–5)
ALP SERPL-CCNC: 83 U/L (ref 46–116)
ALT SERPL W P-5'-P-CCNC: 19 U/L (ref 12–78)
ANION GAP SERPL CALCULATED.3IONS-SCNC: 8 MMOL/L (ref 4–13)
AST SERPL W P-5'-P-CCNC: 18 U/L (ref 5–45)
BASOPHILS # BLD AUTO: 0.01 THOUSANDS/ΜL (ref 0–0.1)
BASOPHILS NFR BLD AUTO: 0 % (ref 0–1)
BILIRUB SERPL-MCNC: 0.4 MG/DL (ref 0.2–1)
BILIRUB UR QL STRIP: NEGATIVE
BUN SERPL-MCNC: 10 MG/DL (ref 5–25)
CALCIUM SERPL-MCNC: 8.7 MG/DL (ref 8.3–10.1)
CHLORIDE SERPL-SCNC: 102 MMOL/L (ref 100–108)
CLARITY UR: CLEAR
CO2 SERPL-SCNC: 27 MMOL/L (ref 21–32)
COLOR UR: NORMAL
CREAT SERPL-MCNC: 1.09 MG/DL (ref 0.6–1.3)
EOSINOPHIL # BLD AUTO: 0.2 THOUSAND/ΜL (ref 0–0.61)
EOSINOPHIL NFR BLD AUTO: 3 % (ref 0–6)
ERYTHROCYTE [DISTWIDTH] IN BLOOD BY AUTOMATED COUNT: 13.1 % (ref 11.6–15.1)
GFR SERPL CREATININE-BSD FRML MDRD: 61 ML/MIN/1.73SQ M
GLUCOSE SERPL-MCNC: 93 MG/DL (ref 65–140)
GLUCOSE UR STRIP-MCNC: NEGATIVE MG/DL
HCT VFR BLD AUTO: 43.1 % (ref 36.5–49.3)
HGB BLD-MCNC: 13.9 G/DL (ref 12–17)
HGB UR QL STRIP.AUTO: NEGATIVE
IMM GRANULOCYTES # BLD AUTO: 0.03 THOUSAND/UL (ref 0–0.2)
IMM GRANULOCYTES NFR BLD AUTO: 0 % (ref 0–2)
KETONES UR STRIP-MCNC: NEGATIVE MG/DL
LEUKOCYTE ESTERASE UR QL STRIP: NEGATIVE
LYMPHOCYTES # BLD AUTO: 1.95 THOUSANDS/ΜL (ref 0.6–4.47)
LYMPHOCYTES NFR BLD AUTO: 25 % (ref 14–44)
MAGNESIUM SERPL-MCNC: 2 MG/DL (ref 1.6–2.6)
MCH RBC QN AUTO: 29.5 PG (ref 26.8–34.3)
MCHC RBC AUTO-ENTMCNC: 32.3 G/DL (ref 31.4–37.4)
MCV RBC AUTO: 92 FL (ref 82–98)
MONOCYTES # BLD AUTO: 0.62 THOUSAND/ΜL (ref 0.17–1.22)
MONOCYTES NFR BLD AUTO: 8 % (ref 4–12)
NEUTROPHILS # BLD AUTO: 5.11 THOUSANDS/ΜL (ref 1.85–7.62)
NEUTS SEG NFR BLD AUTO: 64 % (ref 43–75)
NITRITE UR QL STRIP: NEGATIVE
NRBC BLD AUTO-RTO: 0 /100 WBCS
PH UR STRIP.AUTO: 6.5 [PH]
PLATELET # BLD AUTO: 228 THOUSANDS/UL (ref 149–390)
PMV BLD AUTO: 9.1 FL (ref 8.9–12.7)
POTASSIUM SERPL-SCNC: 4.8 MMOL/L (ref 3.5–5.3)
PROT SERPL-MCNC: 7 G/DL (ref 6.4–8.2)
PROT UR STRIP-MCNC: NEGATIVE MG/DL
RBC # BLD AUTO: 4.71 MILLION/UL (ref 3.88–5.62)
SODIUM SERPL-SCNC: 137 MMOL/L (ref 136–145)
SP GR UR STRIP.AUTO: <=1.005 (ref 1–1.03)
TROPONIN I SERPL-MCNC: <0.02 NG/ML
UROBILINOGEN UR QL STRIP.AUTO: 0.2 E.U./DL
WBC # BLD AUTO: 7.92 THOUSAND/UL (ref 4.31–10.16)

## 2020-11-05 PROCEDURE — 87086 URINE CULTURE/COLONY COUNT: CPT | Performed by: EMERGENCY MEDICINE

## 2020-11-05 PROCEDURE — 97167 OT EVAL HIGH COMPLEX 60 MIN: CPT

## 2020-11-05 PROCEDURE — 81003 URINALYSIS AUTO W/O SCOPE: CPT | Performed by: EMERGENCY MEDICINE

## 2020-11-05 PROCEDURE — G1004 CDSM NDSC: HCPCS

## 2020-11-05 PROCEDURE — 99285 EMERGENCY DEPT VISIT HI MDM: CPT

## 2020-11-05 PROCEDURE — 36415 COLL VENOUS BLD VENIPUNCTURE: CPT | Performed by: EMERGENCY MEDICINE

## 2020-11-05 PROCEDURE — 97163 PT EVAL HIGH COMPLEX 45 MIN: CPT

## 2020-11-05 PROCEDURE — 84484 ASSAY OF TROPONIN QUANT: CPT | Performed by: EMERGENCY MEDICINE

## 2020-11-05 PROCEDURE — 99285 EMERGENCY DEPT VISIT HI MDM: CPT | Performed by: EMERGENCY MEDICINE

## 2020-11-05 PROCEDURE — 83735 ASSAY OF MAGNESIUM: CPT | Performed by: EMERGENCY MEDICINE

## 2020-11-05 PROCEDURE — 85025 COMPLETE CBC W/AUTO DIFF WBC: CPT | Performed by: EMERGENCY MEDICINE

## 2020-11-05 PROCEDURE — 70450 CT HEAD/BRAIN W/O DYE: CPT

## 2020-11-05 PROCEDURE — 97110 THERAPEUTIC EXERCISES: CPT

## 2020-11-05 PROCEDURE — 93005 ELECTROCARDIOGRAM TRACING: CPT

## 2020-11-05 PROCEDURE — 97535 SELF CARE MNGMENT TRAINING: CPT

## 2020-11-05 PROCEDURE — 80053 COMPREHEN METABOLIC PANEL: CPT | Performed by: EMERGENCY MEDICINE

## 2020-11-06 ENCOUNTER — VBI (OUTPATIENT)
Dept: FAMILY MEDICINE CLINIC | Facility: CLINIC | Age: 85
End: 2020-11-06

## 2020-11-06 LAB
ATRIAL RATE: 53 BPM
P AXIS: 47 DEGREES
PR INTERVAL: 212 MS
QRS AXIS: -53 DEGREES
QRSD INTERVAL: 136 MS
QT INTERVAL: 490 MS
QTC INTERVAL: 459 MS
T WAVE AXIS: -3 DEGREES
VENTRICULAR RATE: 53 BPM

## 2020-11-06 PROCEDURE — 93010 ELECTROCARDIOGRAM REPORT: CPT | Performed by: INTERNAL MEDICINE

## 2020-11-07 LAB — BACTERIA UR CULT: NORMAL

## 2020-11-14 PROBLEM — B35.1 ONYCHOMYCOSIS: Status: RESOLVED | Noted: 2019-08-26 | Resolved: 2020-11-14

## 2020-11-14 PROBLEM — I70.209 PERIPHERAL ARTERIOSCLEROSIS (HCC): Status: RESOLVED | Noted: 2019-02-25 | Resolved: 2020-11-14

## 2020-11-14 PROBLEM — M79.671 PAIN IN BOTH FEET: Status: RESOLVED | Noted: 2019-08-26 | Resolved: 2020-11-14

## 2020-11-14 PROBLEM — M79.672 PAIN IN BOTH FEET: Status: RESOLVED | Noted: 2019-08-26 | Resolved: 2020-11-14

## 2020-11-16 ENCOUNTER — APPOINTMENT (EMERGENCY)
Dept: RADIOLOGY | Facility: HOSPITAL | Age: 85
DRG: 884 | End: 2020-11-16
Payer: COMMERCIAL

## 2020-11-16 ENCOUNTER — HOSPITAL ENCOUNTER (INPATIENT)
Facility: HOSPITAL | Age: 85
LOS: 3 days | Discharge: NURSING, REHAB OR LONG-TERM CARE | DRG: 884 | End: 2020-11-20
Attending: EMERGENCY MEDICINE | Admitting: INTERNAL MEDICINE
Payer: COMMERCIAL

## 2020-11-16 DIAGNOSIS — R52 PAIN: ICD-10-CM

## 2020-11-16 DIAGNOSIS — F03.90 DEMENTIA (HCC): ICD-10-CM

## 2020-11-16 DIAGNOSIS — R26.2 AMBULATORY DYSFUNCTION: ICD-10-CM

## 2020-11-16 DIAGNOSIS — R29.6 FREQUENT FALLS: Primary | ICD-10-CM

## 2020-11-16 DIAGNOSIS — F03.91 DEMENTIA WITH BEHAVIORAL DISTURBANCE, UNSPECIFIED DEMENTIA TYPE (HCC): ICD-10-CM

## 2020-11-16 LAB
ALBUMIN SERPL BCP-MCNC: 3.3 G/DL (ref 3.5–5)
ALP SERPL-CCNC: 80 U/L (ref 46–116)
ALT SERPL W P-5'-P-CCNC: 11 U/L (ref 12–78)
ANION GAP SERPL CALCULATED.3IONS-SCNC: 7 MMOL/L (ref 4–13)
AST SERPL W P-5'-P-CCNC: 27 U/L (ref 5–45)
BILIRUB SERPL-MCNC: 0.7 MG/DL (ref 0.2–1)
BUN SERPL-MCNC: 13 MG/DL (ref 5–25)
CALCIUM ALBUM COR SERPL-MCNC: 9.3 MG/DL (ref 8.3–10.1)
CALCIUM SERPL-MCNC: 8.7 MG/DL (ref 8.3–10.1)
CHLORIDE SERPL-SCNC: 102 MMOL/L (ref 100–108)
CO2 SERPL-SCNC: 28 MMOL/L (ref 21–32)
CREAT SERPL-MCNC: 1.05 MG/DL (ref 0.6–1.3)
FLUAV RNA RESP QL NAA+PROBE: NEGATIVE
FLUBV RNA RESP QL NAA+PROBE: NEGATIVE
GFR SERPL CREATININE-BSD FRML MDRD: 64 ML/MIN/1.73SQ M
GLUCOSE SERPL-MCNC: 101 MG/DL (ref 65–140)
POTASSIUM SERPL-SCNC: 4 MMOL/L (ref 3.5–5.3)
PROT SERPL-MCNC: 6.6 G/DL (ref 6.4–8.2)
RSV RNA RESP QL NAA+PROBE: NEGATIVE
SARS-COV-2 RNA RESP QL NAA+PROBE: NEGATIVE
SODIUM SERPL-SCNC: 137 MMOL/L (ref 136–145)

## 2020-11-16 PROCEDURE — 0241U HB NFCT DS VIR RESP RNA 4 TRGT: CPT | Performed by: EMERGENCY MEDICINE

## 2020-11-16 PROCEDURE — 99285 EMERGENCY DEPT VISIT HI MDM: CPT

## 2020-11-16 PROCEDURE — 70450 CT HEAD/BRAIN W/O DYE: CPT

## 2020-11-16 PROCEDURE — 99285 EMERGENCY DEPT VISIT HI MDM: CPT | Performed by: EMERGENCY MEDICINE

## 2020-11-16 PROCEDURE — 97163 PT EVAL HIGH COMPLEX 45 MIN: CPT

## 2020-11-16 PROCEDURE — 99220 PR INITIAL OBSERVATION CARE/DAY 70 MINUTES: CPT | Performed by: INTERNAL MEDICINE

## 2020-11-16 PROCEDURE — 97530 THERAPEUTIC ACTIVITIES: CPT

## 2020-11-16 PROCEDURE — 73502 X-RAY EXAM HIP UNI 2-3 VIEWS: CPT

## 2020-11-16 PROCEDURE — 73080 X-RAY EXAM OF ELBOW: CPT

## 2020-11-16 PROCEDURE — G1004 CDSM NDSC: HCPCS

## 2020-11-16 PROCEDURE — 97167 OT EVAL HIGH COMPLEX 60 MIN: CPT

## 2020-11-16 PROCEDURE — 80053 COMPREHEN METABOLIC PANEL: CPT | Performed by: NURSE PRACTITIONER

## 2020-11-16 RX ORDER — ONDANSETRON 2 MG/ML
4 INJECTION INTRAMUSCULAR; INTRAVENOUS EVERY 6 HOURS PRN
Status: DISCONTINUED | OUTPATIENT
Start: 2020-11-16 | End: 2020-11-20 | Stop reason: HOSPADM

## 2020-11-16 RX ORDER — CHLORAL HYDRATE 500 MG
2000 CAPSULE ORAL DAILY
Status: DISCONTINUED | OUTPATIENT
Start: 2020-11-17 | End: 2020-11-18

## 2020-11-16 RX ORDER — PRAVASTATIN SODIUM 40 MG
40 TABLET ORAL
Status: DISCONTINUED | OUTPATIENT
Start: 2020-11-17 | End: 2020-11-20 | Stop reason: HOSPADM

## 2020-11-16 RX ORDER — LORAZEPAM 2 MG/ML
1 INJECTION INTRAMUSCULAR ONCE
Status: COMPLETED | OUTPATIENT
Start: 2020-11-16 | End: 2020-11-16

## 2020-11-16 RX ORDER — METOPROLOL SUCCINATE 25 MG/1
25 TABLET, EXTENDED RELEASE ORAL DAILY
Status: DISCONTINUED | OUTPATIENT
Start: 2020-11-17 | End: 2020-11-20 | Stop reason: HOSPADM

## 2020-11-16 RX ORDER — SIMVASTATIN 20 MG
20 TABLET ORAL
COMMUNITY

## 2020-11-16 RX ORDER — B-COMPLEX WITH VITAMIN C
1 TABLET ORAL
Status: DISCONTINUED | OUTPATIENT
Start: 2020-11-17 | End: 2020-11-20 | Stop reason: HOSPADM

## 2020-11-16 RX ORDER — PANTOPRAZOLE SODIUM 40 MG/1
40 TABLET, DELAYED RELEASE ORAL
Status: DISCONTINUED | OUTPATIENT
Start: 2020-11-17 | End: 2020-11-20 | Stop reason: HOSPADM

## 2020-11-16 RX ORDER — CHOLECALCIFEROL (VITAMIN D3) 125 MCG
100 CAPSULE ORAL DAILY
Status: DISCONTINUED | OUTPATIENT
Start: 2020-11-17 | End: 2020-11-20 | Stop reason: HOSPADM

## 2020-11-16 RX ORDER — ACETAMINOPHEN 325 MG/1
650 TABLET ORAL EVERY 6 HOURS PRN
Status: DISCONTINUED | OUTPATIENT
Start: 2020-11-16 | End: 2020-11-20 | Stop reason: HOSPADM

## 2020-11-16 RX ORDER — OMEGA-3S/DHA/EPA/FISH OIL/D3 300MG-1000
400 CAPSULE ORAL DAILY
Status: DISCONTINUED | OUTPATIENT
Start: 2020-11-17 | End: 2020-11-20 | Stop reason: HOSPADM

## 2020-11-16 RX ORDER — ASPIRIN 81 MG/1
81 TABLET ORAL DAILY
Status: DISCONTINUED | OUTPATIENT
Start: 2020-11-17 | End: 2020-11-20 | Stop reason: HOSPADM

## 2020-11-16 RX ADMIN — LORAZEPAM 1 MG: 2 INJECTION INTRAMUSCULAR; INTRAVENOUS at 23:20

## 2020-11-17 ENCOUNTER — APPOINTMENT (OUTPATIENT)
Dept: RADIOLOGY | Facility: HOSPITAL | Age: 85
DRG: 884 | End: 2020-11-17
Payer: COMMERCIAL

## 2020-11-17 LAB
BASOPHILS # BLD AUTO: 0.02 THOUSANDS/ΜL (ref 0–0.1)
BASOPHILS NFR BLD AUTO: 0 % (ref 0–1)
EOSINOPHIL # BLD AUTO: 0.36 THOUSAND/ΜL (ref 0–0.61)
EOSINOPHIL NFR BLD AUTO: 4 % (ref 0–6)
ERYTHROCYTE [DISTWIDTH] IN BLOOD BY AUTOMATED COUNT: 12.7 % (ref 11.6–15.1)
HCT VFR BLD AUTO: 43 % (ref 36.5–49.3)
HGB BLD-MCNC: 14 G/DL (ref 12–17)
IMM GRANULOCYTES # BLD AUTO: 0.03 THOUSAND/UL (ref 0–0.2)
IMM GRANULOCYTES NFR BLD AUTO: 0 % (ref 0–2)
LYMPHOCYTES # BLD AUTO: 1.91 THOUSANDS/ΜL (ref 0.6–4.47)
LYMPHOCYTES NFR BLD AUTO: 21 % (ref 14–44)
MAGNESIUM SERPL-MCNC: 1.9 MG/DL (ref 1.6–2.6)
MCH RBC QN AUTO: 29.1 PG (ref 26.8–34.3)
MCHC RBC AUTO-ENTMCNC: 32.6 G/DL (ref 31.4–37.4)
MCV RBC AUTO: 89 FL (ref 82–98)
MONOCYTES # BLD AUTO: 0.75 THOUSAND/ΜL (ref 0.17–1.22)
MONOCYTES NFR BLD AUTO: 8 % (ref 4–12)
NEUTROPHILS # BLD AUTO: 5.95 THOUSANDS/ΜL (ref 1.85–7.62)
NEUTS SEG NFR BLD AUTO: 67 % (ref 43–75)
NRBC BLD AUTO-RTO: 0 /100 WBCS
PLATELET # BLD AUTO: 213 THOUSANDS/UL (ref 149–390)
PMV BLD AUTO: 9.3 FL (ref 8.9–12.7)
RBC # BLD AUTO: 4.81 MILLION/UL (ref 3.88–5.62)
VIT B12 SERPL-MCNC: 599 PG/ML (ref 100–900)
WBC # BLD AUTO: 9.02 THOUSAND/UL (ref 4.31–10.16)

## 2020-11-17 PROCEDURE — 99226 PR SBSQ OBSERVATION CARE/DAY 35 MINUTES: CPT | Performed by: NURSE PRACTITIONER

## 2020-11-17 PROCEDURE — 93880 EXTRACRANIAL BILAT STUDY: CPT

## 2020-11-17 PROCEDURE — 93880 EXTRACRANIAL BILAT STUDY: CPT | Performed by: SURGERY

## 2020-11-17 PROCEDURE — 85025 COMPLETE CBC W/AUTO DIFF WBC: CPT | Performed by: NURSE PRACTITIONER

## 2020-11-17 PROCEDURE — 83735 ASSAY OF MAGNESIUM: CPT | Performed by: NURSE PRACTITIONER

## 2020-11-17 PROCEDURE — 82607 VITAMIN B-12: CPT | Performed by: NURSE PRACTITIONER

## 2020-11-17 RX ORDER — MAGNESIUM SULFATE 1 G/100ML
1 INJECTION INTRAVENOUS ONCE
Status: COMPLETED | OUTPATIENT
Start: 2020-11-17 | End: 2020-11-17

## 2020-11-17 RX ORDER — LORAZEPAM 0.5 MG/1
0.5 TABLET ORAL EVERY 8 HOURS PRN
Status: DISCONTINUED | OUTPATIENT
Start: 2020-11-17 | End: 2020-11-20 | Stop reason: HOSPADM

## 2020-11-17 RX ORDER — ESCITALOPRAM OXALATE 5 MG/1
5 TABLET ORAL DAILY
Status: DISCONTINUED | OUTPATIENT
Start: 2020-11-17 | End: 2020-11-18

## 2020-11-17 RX ORDER — RISPERIDONE 0.25 MG/1
0.25 TABLET, FILM COATED ORAL 2 TIMES DAILY
Status: DISCONTINUED | OUTPATIENT
Start: 2020-11-17 | End: 2020-11-20 | Stop reason: HOSPADM

## 2020-11-17 RX ORDER — AMLODIPINE BESYLATE 5 MG/1
5 TABLET ORAL DAILY
Status: DISCONTINUED | OUTPATIENT
Start: 2020-11-17 | End: 2020-11-20 | Stop reason: HOSPADM

## 2020-11-17 RX ORDER — LOSARTAN POTASSIUM 50 MG/1
100 TABLET ORAL DAILY
Status: DISCONTINUED | OUTPATIENT
Start: 2020-11-17 | End: 2020-11-20 | Stop reason: HOSPADM

## 2020-11-17 RX ADMIN — RISPERIDONE 0.25 MG: 0.25 TABLET ORAL at 13:26

## 2020-11-17 RX ADMIN — AMLODIPINE BESYLATE 5 MG: 5 TABLET ORAL at 11:40

## 2020-11-17 RX ADMIN — LORAZEPAM 0.5 MG: 0.5 TABLET ORAL at 23:43

## 2020-11-17 RX ADMIN — LOSARTAN POTASSIUM 100 MG: 50 TABLET, FILM COATED ORAL at 11:40

## 2020-11-17 RX ADMIN — Medication 100 MG: at 11:39

## 2020-11-17 RX ADMIN — METOPROLOL SUCCINATE 25 MG: 25 TABLET, EXTENDED RELEASE ORAL at 11:39

## 2020-11-17 RX ADMIN — CHOLECALCIFEROL (VITAMIN D3) 10 MCG (400 UNIT) TABLET 400 UNITS: at 11:40

## 2020-11-17 RX ADMIN — PRAVASTATIN SODIUM 40 MG: 40 TABLET ORAL at 16:36

## 2020-11-17 RX ADMIN — Medication 2000 MG: at 11:40

## 2020-11-17 RX ADMIN — ENOXAPARIN SODIUM 40 MG: 40 INJECTION SUBCUTANEOUS at 11:39

## 2020-11-17 RX ADMIN — ESCITALOPRAM 5 MG: 5 TABLET, FILM COATED ORAL at 13:28

## 2020-11-17 RX ADMIN — ASPIRIN 81 MG: 81 TABLET, DELAYED RELEASE ORAL at 11:40

## 2020-11-17 RX ADMIN — MAGNESIUM SULFATE HEPTAHYDRATE 1 G: 1 INJECTION, SOLUTION INTRAVENOUS at 10:11

## 2020-11-17 RX ADMIN — RISPERIDONE 0.25 MG: 0.25 TABLET ORAL at 18:21

## 2020-11-18 LAB
ANION GAP SERPL CALCULATED.3IONS-SCNC: 4 MMOL/L (ref 4–13)
BUN SERPL-MCNC: 11 MG/DL (ref 5–25)
CALCIUM SERPL-MCNC: 8.4 MG/DL (ref 8.3–10.1)
CHLORIDE SERPL-SCNC: 103 MMOL/L (ref 100–108)
CO2 SERPL-SCNC: 28 MMOL/L (ref 21–32)
CREAT SERPL-MCNC: 1.12 MG/DL (ref 0.6–1.3)
GFR SERPL CREATININE-BSD FRML MDRD: 59 ML/MIN/1.73SQ M
GLUCOSE SERPL-MCNC: 131 MG/DL (ref 65–140)
MAGNESIUM SERPL-MCNC: 2.1 MG/DL (ref 1.6–2.6)
POTASSIUM SERPL-SCNC: 4.4 MMOL/L (ref 3.5–5.3)
SODIUM SERPL-SCNC: 135 MMOL/L (ref 136–145)

## 2020-11-18 PROCEDURE — 97110 THERAPEUTIC EXERCISES: CPT

## 2020-11-18 PROCEDURE — 80048 BASIC METABOLIC PNL TOTAL CA: CPT | Performed by: NURSE PRACTITIONER

## 2020-11-18 PROCEDURE — 99232 SBSQ HOSP IP/OBS MODERATE 35: CPT | Performed by: NURSE PRACTITIONER

## 2020-11-18 PROCEDURE — 83735 ASSAY OF MAGNESIUM: CPT | Performed by: NURSE PRACTITIONER

## 2020-11-18 PROCEDURE — 97535 SELF CARE MNGMENT TRAINING: CPT

## 2020-11-18 RX ORDER — LORAZEPAM 0.5 MG/1
0.5 TABLET ORAL ONCE
Status: COMPLETED | OUTPATIENT
Start: 2020-11-18 | End: 2020-11-18

## 2020-11-18 RX ORDER — ESCITALOPRAM OXALATE 10 MG/1
10 TABLET ORAL DAILY
Status: DISCONTINUED | OUTPATIENT
Start: 2020-11-19 | End: 2020-11-20 | Stop reason: HOSPADM

## 2020-11-18 RX ADMIN — LOSARTAN POTASSIUM 100 MG: 50 TABLET, FILM COATED ORAL at 09:36

## 2020-11-18 RX ADMIN — ESCITALOPRAM 5 MG: 5 TABLET, FILM COATED ORAL at 09:32

## 2020-11-18 RX ADMIN — OYSTER SHELL CALCIUM WITH VITAMIN D 1 TABLET: 500; 200 TABLET, FILM COATED ORAL at 09:31

## 2020-11-18 RX ADMIN — AMLODIPINE BESYLATE 5 MG: 5 TABLET ORAL at 09:32

## 2020-11-18 RX ADMIN — PRAVASTATIN SODIUM 40 MG: 40 TABLET ORAL at 18:07

## 2020-11-18 RX ADMIN — PANTOPRAZOLE SODIUM 40 MG: 40 TABLET, DELAYED RELEASE ORAL at 06:44

## 2020-11-18 RX ADMIN — ASPIRIN 81 MG: 81 TABLET, DELAYED RELEASE ORAL at 09:32

## 2020-11-18 RX ADMIN — METOPROLOL SUCCINATE 25 MG: 25 TABLET, EXTENDED RELEASE ORAL at 09:31

## 2020-11-18 RX ADMIN — CHOLECALCIFEROL (VITAMIN D3) 10 MCG (400 UNIT) TABLET 400 UNITS: at 09:32

## 2020-11-18 RX ADMIN — LORAZEPAM 0.5 MG: 0.5 TABLET ORAL at 21:43

## 2020-11-18 RX ADMIN — RISPERIDONE 0.25 MG: 0.25 TABLET ORAL at 18:07

## 2020-11-18 RX ADMIN — ENOXAPARIN SODIUM 40 MG: 40 INJECTION SUBCUTANEOUS at 09:31

## 2020-11-18 RX ADMIN — LORAZEPAM 0.5 MG: 0.5 TABLET ORAL at 03:06

## 2020-11-19 PROBLEM — N17.9 AKI (ACUTE KIDNEY INJURY) (HCC): Status: ACTIVE | Noted: 2020-11-19

## 2020-11-19 LAB
ANION GAP SERPL CALCULATED.3IONS-SCNC: 8 MMOL/L (ref 4–13)
BUN SERPL-MCNC: 17 MG/DL (ref 5–25)
CALCIUM SERPL-MCNC: 8.8 MG/DL (ref 8.3–10.1)
CHLORIDE SERPL-SCNC: 102 MMOL/L (ref 100–108)
CO2 SERPL-SCNC: 27 MMOL/L (ref 21–32)
CREAT SERPL-MCNC: 1.31 MG/DL (ref 0.6–1.3)
GFR SERPL CREATININE-BSD FRML MDRD: 49 ML/MIN/1.73SQ M
GLUCOSE SERPL-MCNC: 112 MG/DL (ref 65–140)
MAGNESIUM SERPL-MCNC: 2.2 MG/DL (ref 1.6–2.6)
PHOSPHATE SERPL-MCNC: 4.1 MG/DL (ref 2.3–4.1)
POTASSIUM SERPL-SCNC: 4.1 MMOL/L (ref 3.5–5.3)
SODIUM SERPL-SCNC: 137 MMOL/L (ref 136–145)

## 2020-11-19 PROCEDURE — 80048 BASIC METABOLIC PNL TOTAL CA: CPT | Performed by: NURSE PRACTITIONER

## 2020-11-19 PROCEDURE — 97535 SELF CARE MNGMENT TRAINING: CPT

## 2020-11-19 PROCEDURE — 99232 SBSQ HOSP IP/OBS MODERATE 35: CPT | Performed by: NURSE PRACTITIONER

## 2020-11-19 PROCEDURE — 83735 ASSAY OF MAGNESIUM: CPT | Performed by: NURSE PRACTITIONER

## 2020-11-19 PROCEDURE — 84100 ASSAY OF PHOSPHORUS: CPT | Performed by: NURSE PRACTITIONER

## 2020-11-19 RX ORDER — LORAZEPAM 2 MG/ML
0.5 INJECTION INTRAMUSCULAR ONCE
Status: COMPLETED | OUTPATIENT
Start: 2020-11-19 | End: 2020-11-19

## 2020-11-19 RX ADMIN — ASPIRIN 81 MG: 81 TABLET, DELAYED RELEASE ORAL at 09:44

## 2020-11-19 RX ADMIN — LORAZEPAM 0.5 MG: 0.5 TABLET ORAL at 22:59

## 2020-11-19 RX ADMIN — ENOXAPARIN SODIUM 40 MG: 40 INJECTION SUBCUTANEOUS at 09:46

## 2020-11-19 RX ADMIN — METOPROLOL SUCCINATE 25 MG: 25 TABLET, EXTENDED RELEASE ORAL at 09:47

## 2020-11-19 RX ADMIN — RISPERIDONE 0.25 MG: 0.25 TABLET ORAL at 18:21

## 2020-11-19 RX ADMIN — LORAZEPAM 0.5 MG: 2 INJECTION INTRAMUSCULAR; INTRAVENOUS at 01:28

## 2020-11-19 RX ADMIN — LOSARTAN POTASSIUM 100 MG: 50 TABLET, FILM COATED ORAL at 09:46

## 2020-11-19 RX ADMIN — PANTOPRAZOLE SODIUM 40 MG: 40 TABLET, DELAYED RELEASE ORAL at 09:46

## 2020-11-19 RX ADMIN — LORAZEPAM 0.5 MG: 0.5 TABLET ORAL at 11:47

## 2020-11-19 RX ADMIN — SODIUM CHLORIDE 250 ML: 0.9 INJECTION, SOLUTION INTRAVENOUS at 12:04

## 2020-11-19 RX ADMIN — PRAVASTATIN SODIUM 40 MG: 40 TABLET ORAL at 18:21

## 2020-11-19 RX ADMIN — OYSTER SHELL CALCIUM WITH VITAMIN D 1 TABLET: 500; 200 TABLET, FILM COATED ORAL at 09:45

## 2020-11-19 RX ADMIN — RISPERIDONE 0.25 MG: 0.25 TABLET ORAL at 09:45

## 2020-11-19 RX ADMIN — Medication 100 MG: at 09:46

## 2020-11-19 RX ADMIN — AMLODIPINE BESYLATE 5 MG: 5 TABLET ORAL at 09:44

## 2020-11-19 RX ADMIN — ESCITALOPRAM OXALATE 10 MG: 10 TABLET ORAL at 09:46

## 2020-11-19 RX ADMIN — CHOLECALCIFEROL (VITAMIN D3) 10 MCG (400 UNIT) TABLET 400 UNITS: at 09:45

## 2020-11-20 VITALS
TEMPERATURE: 97.4 F | HEIGHT: 73 IN | HEART RATE: 84 BPM | SYSTOLIC BLOOD PRESSURE: 126 MMHG | DIASTOLIC BLOOD PRESSURE: 76 MMHG | WEIGHT: 192 LBS | RESPIRATION RATE: 16 BRPM | BODY MASS INDEX: 25.45 KG/M2 | OXYGEN SATURATION: 98 %

## 2020-11-20 PROBLEM — N17.9 AKI (ACUTE KIDNEY INJURY) (HCC): Status: RESOLVED | Noted: 2020-11-19 | Resolved: 2020-11-20

## 2020-11-20 PROBLEM — F03.91 DEMENTIA WITH BEHAVIORAL DISTURBANCE (HCC): Status: ACTIVE | Noted: 2020-11-16

## 2020-11-20 LAB
ANION GAP SERPL CALCULATED.3IONS-SCNC: 7 MMOL/L (ref 4–13)
BUN SERPL-MCNC: 15 MG/DL (ref 5–25)
CALCIUM SERPL-MCNC: 8.8 MG/DL (ref 8.3–10.1)
CHLORIDE SERPL-SCNC: 103 MMOL/L (ref 100–108)
CO2 SERPL-SCNC: 26 MMOL/L (ref 21–32)
CREAT SERPL-MCNC: 1.05 MG/DL (ref 0.6–1.3)
GFR SERPL CREATININE-BSD FRML MDRD: 64 ML/MIN/1.73SQ M
GLUCOSE SERPL-MCNC: 108 MG/DL (ref 65–140)
POTASSIUM SERPL-SCNC: 4 MMOL/L (ref 3.5–5.3)
SODIUM SERPL-SCNC: 136 MMOL/L (ref 136–145)

## 2020-11-20 PROCEDURE — 80048 BASIC METABOLIC PNL TOTAL CA: CPT | Performed by: NURSE PRACTITIONER

## 2020-11-20 PROCEDURE — 99239 HOSP IP/OBS DSCHRG MGMT >30: CPT | Performed by: INTERNAL MEDICINE

## 2020-11-20 PROCEDURE — 97110 THERAPEUTIC EXERCISES: CPT

## 2020-11-20 PROCEDURE — 97116 GAIT TRAINING THERAPY: CPT

## 2020-11-20 RX ORDER — RISPERIDONE 0.25 MG/1
0.25 TABLET, FILM COATED ORAL 2 TIMES DAILY
Refills: 0
Start: 2020-11-20

## 2020-11-20 RX ORDER — ACETAMINOPHEN 325 MG/1
650 TABLET ORAL EVERY 6 HOURS PRN
Refills: 0
Start: 2020-11-20

## 2020-11-20 RX ORDER — ESCITALOPRAM OXALATE 10 MG/1
10 TABLET ORAL DAILY
Refills: 0
Start: 2020-11-20

## 2020-11-20 RX ORDER — LORAZEPAM 0.5 MG/1
0.5 TABLET ORAL EVERY 8 HOURS PRN
Qty: 9 TABLET | Refills: 0 | Status: SHIPPED | OUTPATIENT
Start: 2020-11-20 | End: 2020-11-23

## 2020-11-20 RX ADMIN — LORAZEPAM 0.5 MG: 0.5 TABLET ORAL at 18:57

## 2020-11-20 RX ADMIN — ESCITALOPRAM OXALATE 10 MG: 10 TABLET ORAL at 09:37

## 2020-11-20 RX ADMIN — LORAZEPAM 0.5 MG: 0.5 TABLET ORAL at 09:37

## 2020-11-20 RX ADMIN — RISPERIDONE 0.25 MG: 0.25 TABLET ORAL at 17:08

## 2020-11-20 RX ADMIN — METOPROLOL SUCCINATE 25 MG: 25 TABLET, EXTENDED RELEASE ORAL at 09:45

## 2020-11-20 RX ADMIN — ASPIRIN 81 MG: 81 TABLET, DELAYED RELEASE ORAL at 09:36

## 2020-11-20 RX ADMIN — PANTOPRAZOLE SODIUM 40 MG: 40 TABLET, DELAYED RELEASE ORAL at 06:07

## 2020-11-20 RX ADMIN — RISPERIDONE 0.25 MG: 0.25 TABLET ORAL at 09:37

## 2020-11-20 RX ADMIN — ENOXAPARIN SODIUM 40 MG: 40 INJECTION SUBCUTANEOUS at 09:35

## 2020-11-20 RX ADMIN — PRAVASTATIN SODIUM 40 MG: 40 TABLET ORAL at 17:08

## 2020-11-23 DIAGNOSIS — I10 BENIGN ESSENTIAL HYPERTENSION: ICD-10-CM

## 2020-11-24 RX ORDER — METOPROLOL SUCCINATE 25 MG/1
TABLET, EXTENDED RELEASE ORAL
Qty: 90 TABLET | Refills: 3 | Status: SHIPPED | OUTPATIENT
Start: 2020-11-24

## 2021-02-16 ENCOUNTER — TELEPHONE (OUTPATIENT)
Dept: FAMILY MEDICINE CLINIC | Facility: CLINIC | Age: 86
End: 2021-02-16

## 2021-02-16 NOTE — TELEPHONE ENCOUNTER
Spoke with wife who states patient is permanently going to be in nursing home and that the physicians there have taken over his care  Patient will no longer be seen in this office     Rip Rico MA

## 2021-02-18 ENCOUNTER — TELEPHONE (OUTPATIENT)
Dept: ADMINISTRATIVE | Facility: OTHER | Age: 86
End: 2021-02-18

## 2021-02-18 NOTE — TELEPHONE ENCOUNTER
----- Message from Malini Dolan, [address] sent at 2/16/2021  3:09 PM EST -----  Spoke with wife who states patient is permanently going to be in nursing home and that the physicians there have taken over his care  Patient will no longer be seen in this office  Patient attribution in patient's chart    Malini Dolan MA

## 2021-02-18 NOTE — TELEPHONE ENCOUNTER
02/18/21 5:04 PM     Thank you for your request  Your request has been received, reviewed, and the patient chart updated  The PCP has successfully been removed with a patient attribution note  This message will now be completed      Thank you  Dina Willoughby